# Patient Record
Sex: FEMALE | Race: BLACK OR AFRICAN AMERICAN | Employment: OTHER | URBAN - METROPOLITAN AREA
[De-identification: names, ages, dates, MRNs, and addresses within clinical notes are randomized per-mention and may not be internally consistent; named-entity substitution may affect disease eponyms.]

---

## 2017-01-02 RX ORDER — METFORMIN HYDROCHLORIDE 500 MG/1
TABLET ORAL
Qty: 30 TAB | Refills: 11 | Status: SHIPPED | OUTPATIENT
Start: 2017-01-02 | End: 2017-12-28 | Stop reason: SDUPTHER

## 2017-02-01 RX ORDER — CELECOXIB 200 MG/1
CAPSULE ORAL
Qty: 60 CAP | Refills: 11 | Status: SHIPPED | OUTPATIENT
Start: 2017-02-01 | End: 2017-03-06 | Stop reason: SDUPTHER

## 2017-02-01 RX ORDER — PHENYTOIN SODIUM 100 MG/1
CAPSULE, EXTENDED RELEASE ORAL
Qty: 90 CAP | Refills: 11 | Status: SHIPPED | OUTPATIENT
Start: 2017-02-01 | End: 2018-01-09 | Stop reason: SDUPTHER

## 2017-03-06 RX ORDER — CELECOXIB 200 MG/1
CAPSULE ORAL
Qty: 60 CAP | Refills: 11 | Status: SHIPPED | OUTPATIENT
Start: 2017-03-06 | End: 2017-12-07 | Stop reason: ALTCHOICE

## 2017-03-07 ENCOUNTER — HOSPITAL ENCOUNTER (OUTPATIENT)
Dept: MAMMOGRAPHY | Age: 72
Discharge: HOME | End: 2017-03-07
Admitting: INTERNAL MEDICINE
Payer: MEDICARE

## 2017-03-07 DIAGNOSIS — Z12.31 VISIT FOR SCREENING MAMMOGRAM: ICD-10-CM

## 2017-03-07 PROCEDURE — 77067 SCR MAMMO BI INCL CAD: CPT

## 2017-04-21 ENCOUNTER — TELEPHONE (OUTPATIENT)
Dept: INTERNAL MEDICINE CLINIC | Age: 72
End: 2017-04-21

## 2017-04-21 NOTE — TELEPHONE ENCOUNTER
Patient called to schedule medicare wellness appointment.  No answer, holly was left to call office back

## 2017-06-23 ENCOUNTER — APPOINTMENT (OUTPATIENT)
Dept: CT IMAGING | Age: 72
End: 2017-06-23
Attending: EMERGENCY MEDICINE
Payer: MEDICARE

## 2017-06-23 ENCOUNTER — HOSPITAL ENCOUNTER (EMERGENCY)
Age: 72
Discharge: HOME | End: 2017-06-23
Attending: EMERGENCY MEDICINE | Admitting: EMERGENCY MEDICINE
Payer: MEDICARE

## 2017-06-23 VITALS
SYSTOLIC BLOOD PRESSURE: 180 MMHG | HEART RATE: 90 BPM | BODY MASS INDEX: 26.5 KG/M2 | HEIGHT: 60 IN | WEIGHT: 135 LBS | OXYGEN SATURATION: 100 % | RESPIRATION RATE: 16 BRPM | TEMPERATURE: 98.4 F | DIASTOLIC BLOOD PRESSURE: 68 MMHG

## 2017-06-23 DIAGNOSIS — S00.93XA CONTUSION OF HEAD, UNSPECIFIED PART OF HEAD, INITIAL ENCOUNTER: Primary | ICD-10-CM

## 2017-06-23 PROCEDURE — 70486 CT MAXILLOFACIAL W/O DYE: CPT

## 2017-06-23 PROCEDURE — 70450 CT HEAD/BRAIN W/O DYE: CPT

## 2017-06-23 PROCEDURE — 99283 EMERGENCY DEPT VISIT LOW MDM: CPT

## 2017-06-23 NOTE — DISCHARGE INSTRUCTIONS
Head Injury: Care Instructions  Your Care Instructions  Most injuries to the head are minor. Bumps, cuts, and scrapes on the head and face usually heal well and can be treated the same as injuries to other parts of the body. Although it's rare, once in a while a more serious problem shows up after you are home. So it's good to be on the lookout for symptoms for a day or two. Follow-up care is a key part of your treatment and safety. Be sure to make and go to all appointments, and call your doctor if you are having problems. It's also a good idea to know your test results and keep a list of the medicines you take. How can you care for yourself at home? · Follow your doctor's instructions. He or she will tell you if you need someone to watch you closely for the next 24 hours or longer. · Take it easy for the next few days or more if you are not feeling well. · Ask your doctor when it's okay for you to go back to activities like driving a car, riding a bike, or operating machinery. When should you call for help? Call 911 anytime you think you may need emergency care. For example, call if:  · You have a seizure. · You passed out (lost consciousness). · You are confused or can't stay awake. Call your doctor now or seek immediate medical care if:  · You have new or worse vomiting. · You feel less alert. · You have new weakness or numbness in any part of your body. Watch closely for changes in your health, and be sure to contact your doctor if:  · You do not get better as expected. · You have new symptoms, such as headaches, trouble concentrating, or changes in mood. Where can you learn more? Go to http://julianne-shakila.info/. Enter X161 in the search box to learn more about \"Head Injury: Care Instructions. \"  Current as of: October 14, 2016  Content Version: 11.3  © 6001-7925 [employer].  Care instructions adapted under license by [employer] (which disclaims liability or warranty for this information). If you have questions about a medical condition or this instruction, always ask your healthcare professional. [place] [address] any warranty or liability for your use of this information.

## 2017-06-23 NOTE — ED TRIAGE NOTES
Per EMS pt was walking and tripped on an uneven floor. Pt fell hitting head and left orbit of eye on floor. Fall was witnessed and pt did not have LOC.

## 2017-06-23 NOTE — ED PROVIDER NOTES
HPI Comments: 66 y.o. female with past medical history significant for seizures, HTN, and diabetes who presents from assisted living via EMS for evaluation after a fall. History is provided by nursing home supervisor. Pt was reportedly walking when she tripped and fell, hitting the left side of her head. Pt presents to the ED with swelling next to her left eye. She reportedly did not lose consciousness. Staff noted no other difficulties or complaints. Witnessed fall. Patient has no complaints. Unable to provide much of her own history due to dementia. There are no other acute medical concerns at this time. Social hx: nonsmoker, no EtOH use  PCP: No primary care provider on file. Note written by Lorie Parikh, as dictated by Farhan Doll MD 2:55 PM      The history is provided by a caregiver. No  was used. Past Medical History:   Diagnosis Date    Diabetes ([facility] [address]     Hypertension     Seizures ([facility] [address]        No past surgical history on file. No family history on file. Social History     Social History    Marital status: N/A     Spouse name: N/A    Number of children: N/A    Years of education: N/A     Occupational History    Not on file. Social History Main Topics    Smoking status: Never Smoker    Smokeless tobacco: Not on file    Alcohol use No    Drug use: Not on file    Sexual activity: Not on file     Other Topics Concern    Not on file     Social History Narrative    No narrative on file         ALLERGIES: Review of patient's allergies indicates no known allergies. Review of Systems   Constitutional: Negative for activity change, appetite change and fatigue. HENT: Negative for ear pain, facial swelling, sore throat and trouble swallowing. Eyes: Negative for pain, discharge and visual disturbance. Respiratory: Negative for chest tightness, shortness of breath and wheezing. Cardiovascular: Negative for chest pain and palpitations. Gastrointestinal: Negative for abdominal pain, blood in stool, nausea and vomiting. Genitourinary: Negative for difficulty urinating, flank pain and hematuria. Musculoskeletal: Negative for arthralgias, joint swelling, myalgias and neck pain. Skin: Positive for wound. Negative for color change and rash. Neurological: Negative for dizziness, weakness, numbness and headaches. Hematological: Negative for adenopathy. Does not bruise/bleed easily. Psychiatric/Behavioral: Negative for behavioral problems, confusion and sleep disturbance. All other systems reviewed and are negative. Vitals:    06/23/17 1453   BP: 180/68   Pulse: 90   Resp: 16   Temp: 98.4 °F (36.9 °C)   SpO2: 100%   Weight: 61.2 kg (135 lb)   Height: 5' (1.524 m)            Physical Exam   Constitutional: She is oriented to person, place, and time. She appears well-developed and well-nourished. No distress. HENT:   Head: Normocephalic. Nose: Nose normal.   Mouth/Throat: Oropharynx is clear and moist.   Hematoma lateral aspect of the left Orbit     Eyes: Conjunctivae and EOM are normal. Pupils are equal, round, and reactive to light. No scleral icterus. Neck: Normal range of motion. Neck supple. No JVD present. No tracheal deviation present. No thyromegaly present. No carotid bruits noted. Cardiovascular: Normal rate, regular rhythm, normal heart sounds and intact distal pulses. Exam reveals no gallop and no friction rub. No murmur heard. Pulmonary/Chest: Effort normal and breath sounds normal. No respiratory distress. She has no wheezes. She has no rales. She exhibits no tenderness. Abdominal: Soft. Bowel sounds are normal. She exhibits no distension and no mass. There is no tenderness. There is no rebound and no guarding. Musculoskeletal: Normal range of motion. She exhibits no edema or tenderness. Lymphadenopathy:     She has no cervical adenopathy.    Neurological: She is alert and oriented to person, place, and time. She has normal reflexes. No cranial nerve deficit. Coordination normal.   Skin: Skin is warm and dry. No rash noted. No erythema. Psychiatric: She has a normal mood and affect. Her behavior is normal. Judgment and thought content normal.   Nursing note and vitals reviewed. Note written by Lorie Singh, as dictated by Harshad Batres MD 2:55 PM    MDM  Number of Diagnoses or Management Options  Contusion of head, unspecified part of head, initial encounter: new and requires workup     Amount and/or Complexity of Data Reviewed  Tests in the radiology section of CPT®: ordered and reviewed  Decide to obtain previous medical records or to obtain history from someone other than the patient: yes  Review and summarize past medical records: yes  Independent visualization of images, tracings, or specimens: yes    Risk of Complications, Morbidity, and/or Mortality  Presenting problems: moderate  Diagnostic procedures: high  Management options: moderate    Patient Progress  Patient progress: stable    ED Course       Procedures    CT of the head and facial bones are negative. The patient was able to ambulate in the ED and there were no other acute findings to suggest additional injury. The fall was witnessed.  Will discharge home with symptomatic treatment and have the patient followup with own MD if needed or to return to the ED;

## 2017-07-18 ENCOUNTER — OFFICE VISIT (OUTPATIENT)
Dept: INTERNAL MEDICINE CLINIC | Age: 72
End: 2017-07-18

## 2017-07-18 VITALS
TEMPERATURE: 98.1 F | HEIGHT: 60 IN | SYSTOLIC BLOOD PRESSURE: 155 MMHG | DIASTOLIC BLOOD PRESSURE: 68 MMHG | OXYGEN SATURATION: 100 % | HEART RATE: 76 BPM | RESPIRATION RATE: 19 BRPM | BODY MASS INDEX: 27.09 KG/M2 | WEIGHT: 138 LBS

## 2017-07-18 DIAGNOSIS — E78.00 HYPERCHOLESTEROLEMIA: ICD-10-CM

## 2017-07-18 DIAGNOSIS — Z71.89 ACP (ADVANCE CARE PLANNING): ICD-10-CM

## 2017-07-18 DIAGNOSIS — E11.9 TYPE 2 DIABETES MELLITUS WITHOUT COMPLICATION, WITHOUT LONG-TERM CURRENT USE OF INSULIN (HCC): ICD-10-CM
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
## 2017-07-18 DIAGNOSIS — I10 ESSENTIAL HYPERTENSION: ICD-10-CM

## 2017-07-18 DIAGNOSIS — Z13.39 SCREENING FOR ALCOHOLISM: ICD-10-CM

## 2017-07-18 DIAGNOSIS — Z00.00 ROUTINE GENERAL MEDICAL EXAMINATION AT A HEALTH CARE FACILITY: Primary | ICD-10-CM

## 2017-07-18 DIAGNOSIS — R56.9 SEIZURES (HCC): ICD-10-CM

## 2017-07-18 NOTE — MR AVS SNAPSHOT
Visit Information Date & Time Provider Department Dept. Phone Encounter #  
 7/18/2017  4:30 PM Gt Valdes MD [facility] - [facility] [phone] [id number] Follow-up Instructions Return in about 4 months (around 11/18/2017). Follow-up and Disposition History Upcoming Health Maintenance Date Due FOBT Q 1 YEAR AGE 50-75 3/17/2016 EYE EXAM RETINAL OR DILATED Q1 4/5/2017 FOOT EXAM Q1 7/28/2017 INFLUENZA AGE 9 TO ADULT 8/1/2017 HEMOGLOBIN A1C Q6M 1/18/2018 GLAUCOMA SCREENING Q2Y 4/5/2018 Pneumococcal 65+ Low/Medium Risk (2 of 2 - PPSV23) 7/18/2018 MICROALBUMIN Q1 7/18/2018 LIPID PANEL Q1 7/18/2018 MEDICARE YEARLY EXAM 7/19/2018 BREAST CANCER SCRN MAMMOGRAM 3/7/2019 DTaP/Tdap/Td series (2 - Td) 7/18/2027 Allergies as of 7/18/2017  Review Complete On: 7/18/2017 By: Gt Valdes MD  
 No Known Allergies Current Immunizations  Never Reviewed No immunizations on file. Not reviewed this visit You Were Diagnosed With   
  
 Codes Comments Routine general medical examination at a health care facility    -  Primary ICD-10-CM: Z00.00 ICD-9-CM: V70.0 Screening for alcoholism     ICD-10-CM: Z13.89 ICD-9-CM: V79.1 Type 2 diabetes mellitus without complication, without long-term current use of insulin (HCC)     ICD-10-CM: E11.9 ICD-9-CM: 250.00 Seizures ([address]     ICD-10-CM: R56.9 ICD-9-CM: 780.39 Hypercholesterolemia     ICD-10-CM: E78.00 ICD-9-CM: 272.0 Essential hypertension     ICD-10-CM: I10 
ICD-9-CM: 401.9 Fall, sequela     ICD-10-CM: W19. XXXS 
ICD-9-CM: 909.4, E929.3 ACP (advance care planning)     ICD-10-CM: Z71.89 ICD-9-CM: V65.49 Vitals BP Pulse Temp Resp Height(growth percentile) Weight(growth percentile) 155/68 (BP 1 Location: Right arm, BP Patient Position: Sitting) 76 98.1 °F (36.7 °C) (Oral) 19 5' (1.524 m) 138 lb (62.6 kg) SpO2 BMI OB Status Smoking Status 100% 26.95 kg/m2 Postmenopausal Never Smoker BMI and BSA Data Body Mass Index Body Surface Area  
 26.95 kg/m 2 1.63 m 2 Preferred Pharmacy Pharmacy Name [facility] [address] [phone] Your Updated Medication List  
  
   
This list is accurate as of: 7/18/17  5:21 PM.  Always use your most recent med list.  
  
  
  
  
 celecoxib 200 mg capsule Commonly known as:  CELEBREX Take 1 capsule twice daily  
  
 lisinopril-hydroCHLOROthiazide 10-12.5 mg per tablet Commonly known as:  PRINZIDE, ZESTORETIC  
take 1 tablet by mouth once daily  
  
 lovastatin 40 mg tablet Commonly known as:  MEVACOR  
take 1 tablet by mouth once daily WITH A MEAL  
  
 metFORMIN 500 mg tablet Commonly known as:  GLUCOPHAGE  
take 1 tablet by mouth once daily -WITH DINNER  
  
 phenytoin  mg ER capsule Commonly known as:  DILANTIN ER Take 3 tabs on odd day and 2 tabs on even days We Performed the Following CBC WITH AUTOMATED DIFF [85818 CPT(R)] DO NOT RESUSCITATE Kushal Soto HEMOGLOBIN A1C WITH EAG [53083 CPT(R)] LIPID PANEL [77325 CPT(R)] METABOLIC PANEL, COMPREHENSIVE [32554 CPT(R)] MICROALB/CREAT RATIO, TIMED UR F6310220 CPT(R)] PHENYTOIN [id number] CPT(R)] UT COLLECTION VENOUS BLOOD,VENIPUNCTURE [id number] CPT(R)] TSH 3RD GENERATION [41675 CPT(R)] URINALYSIS W/ RFLX MICROSCOPIC [29642 CPT(R)] Follow-up Instructions Return in about 4 months (around 11/18/2017). Introducing [facility]! Brenna Vázquez introduces [employer] patient portal. Now you can access parts of your medical record, email your doctor's office, and request medication refills online. 1. In your internet browser, go to [email or web address] 2. Click on the First Time User? Click Here link in the Sign In box.  You will see the New Member Sign Up page. 3. Enter your [employer] Access Code exactly as it appears below. You will not need to use this code after youve completed the sign-up process. If you do not sign up before the expiration date, you must request a new code. · [employer] Access Code: [email or web address] Expires: 9/21/2017  4:28 PM 
 
4. Enter the last four digits of your Social Security Number (xxxx) and Date of Birth (mm/dd/yyyy) as indicated and click Submit. You will be taken to the next sign-up page. 5. Create a [employer] ID. This will be your [employer] login ID and cannot be changed, so think of one that is secure and easy to remember. 6. Create a [employer] password. You can change your password at any time. 7. Enter your Password Reset Question and Answer. This can be used at a later time if you forget your password. 8. Enter your e-mail address. You will receive e-mail notification when new information is available in [address] 9. Click Sign Up. You can now view and download portions of your medical record. 10. Click the Download Summary menu link to download a portable copy of your medical information. If you have questions, please visit the Frequently Asked Questions section of the [employer] website. Remember, [employer] is NOT to be used for urgent needs. For medical emergencies, dial 911. Now available from your iPhone and Android! Please provide this summary of care documentation to your next provider. Your primary care clinician is listed as Kayla Boyd. If you have any questions after today's visit, please call [phone].

## 2017-07-18 NOTE — PROGRESS NOTES
.1. Have you been to the ER, urgent care clinic since your last visit? Hospitalized since your last visit? No    2. Have you seen or consulted any other health care providers outside of the [address] [place] [address] since your last visit? Include any pap smears or colon screening. No    This is a Subsequent Medicare Annual Wellness Visit providing Personalized Prevention Plan Services (PPPS) (Performed 12 months after initial AWV and PPPS )    I have reviewed the patient's medical history in detail and updated the computerized patient record. HistoryPatient returns today ambulatory with assistive device, alert and oriented and history is primarily obtained from her sister, Dani Marie. There are no new complaints and patient is seen for evaluation. Past Medical History:   Diagnosis Date    Colonoscopy refused 1-7-16    Diabetes [facility])     Fall     Fracture of rib of left side 7-23-15    Hypercholesterolemia     Hypertension     S/P colonoscopy 4-17-03    n    Seizures ([facility] [address]       History reviewed. No pertinent surgical history.   Current Outpatient Prescriptions   Medication Sig Dispense Refill    celecoxib (CELEBREX) 200 mg capsule Take 1 capsule twice daily 60 Cap 11    phenytoin ER (DILANTIN ER) 100 mg ER capsule Take 3 tabs on odd day and 2 tabs on even days 90 Cap 11    metFORMIN (GLUCOPHAGE) 500 mg tablet take 1 tablet by mouth once daily -WITH DINNER 30 Tab 11    lisinopril-hydroCHLOROthiazide (PRINZIDE, ZESTORETIC) 10-12.5 mg per tablet take 1 tablet by mouth once daily 30 Tab 11    lovastatin (MEVACOR) 40 mg tablet take 1 tablet by mouth once daily WITH A MEAL 30 Tab 11     No Known Allergies  Family History   Problem Relation Age of Onset    Diabetes Mother     No Known Problems Father      Social History   Substance Use Topics    Smoking status: Never Smoker    Smokeless tobacco: Not on file    Alcohol use No     Patient Active Problem List   Diagnosis Code    Hypercholesterolemia E78.00    Diabetes ([facility] [address] E11.9    Hypertension I10    Seizures ([facility] [address] R56.9    Fall W19. Magui Kramer    Fracture of rib of left side S22.32XA       Depression Risk Factor Screening:     PHQ over the last two weeks 7/18/2017   PHQ Not Done Patient Decline   Little interest or pleasure in doing things Not at all   Feeling down, depressed or hopeless Not at all   Total Score PHQ 2 0     Alcohol Risk Factor Screening: On any occasion during the past 3 months, have you had more than 3 drinks containing alcohol? No    Do you average more than 7 drinks per week? No        Functional Ability and Level of Safety:     Hearing Loss   normal-to-mild    Activities of Daily Living   Partial assistance. Requires assistance with: bathing and hygiene, toileting and dressing    Fall Risk   Fall Risk Assessment, last 12 mths 7/18/2017   Able to walk? Yes   Fall in past 12 months? No   Fall with injury? -   Number of falls in past 12 months -   Fall Risk Score -     Abuse Screen   Patient is not abused    Review of Systems   A comprehensive review of systems was negative except for that written in the HPI. Physical Examination     Evaluation of Cognitive Function:  Mood/affect:  neutral  Appearance: age appropriate  Family member/caregiver input: sister    Visit Vitals    /68 (BP 1 Location: Right arm, BP Patient Position: Sitting)    Pulse 76    Temp 98.1 °F (36.7 °C) (Oral)    Resp 19    Ht 5' (1.524 m)    Wt 138 lb (62.6 kg)    SpO2 100%    BMI 26.95 kg/m2     General:  Alert, cooperative, no distress, appears stated age. Head:  Normocephalic, without obvious abnormality, atraumatic. Eyes:  Conjunctivae/corneas clear. PERRL, EOMs intact. Fundi benign. Ears:  Normal TMs and external ear canals both ears. Nose: Nares normal. Septum midline. Mucosa normal. No drainage or sinus tenderness.    Throat: Lips, mucosa, and tongue normal. Teeth and gums normal.   Neck: Supple, symmetrical, trachea midline, no adenopathy, thyroid: no enlargement/tenderness/nodules, no carotid bruit and no JVD. Back:   Symmetric, no curvature. ROM normal. No CVA tenderness. Lungs:   Clear to auscultation bilaterally. Chest wall:  No tenderness or deformity. Heart:  Regular rate and rhythm, S1, S2 normal, no murmur, click, rub or gallop. Breast Exam:  No tenderness, masses, or nipple abnormality. Abdomen:   Soft, non-tender. Bowel sounds normal. No masses,  No organomegaly. Genitalia:  na   Rectal:     Extremities: Extremities normal, atraumatic, no cyanosis or edema. Pulses: 2+ and symmetric all extremities. Skin: Skin color, texture, turgor normal. No rashes or lesions. Lymph nodes: Cervical, supraclavicular, and axillary nodes normal.   Neurologic: CNII-XII intact. Normal strength, sensation and reflexes throughout. Patient Care Team:  Prabhakar Fowler MD as PCP - General (Internal Medicine)  Prabhakar Fowler MD (Internal Medicine)    Advice/Referrals/Counseling   Education and counseling provided:  Are appropriate based on today's review and evaluation     Advance Care Planning (ACP) Provider Conversation Snapshot    Date of ACP Conversation: 07/18/17  Persons included in Conversation:  patient  Length of ACP Conversation in minutes:  <16 minutes (Non-Billable)    Authorized Decision Maker (if patient is incapable of making informed decisions): This person is:   Healthcare Agent/Medical Power of  under Advance Directive  sister        For Patients with Decision Making Capacity:   Values/Goals: Exploration of values, goals, and preferences if recovery is not expected, even with continued medical treatment in the event of:  Imminent death  Severe, permanent brain injury  \"In these circumstances, what matters most to you? \"  Care focused more on comfort or quality of life.     Conversation Outcomes / Follow-Up Plan:   Entered DNR order (If yes, complete Durable DNR form)    Patient's medical status is stable. Blood pressure is at the upper limits of normal but acceptable. She is approaching her ideal body weight. Physical form is completed for the  center. Appropriate laboratory studies have been requested. She will return to the office in four months, sooner if she has any problems. Assessment/Plan       ICD-10-CM ICD-9-CM    1. Routine general medical examination at a health care facility Z00.00 V70.0    2. Screening for alcoholism Z13.89 V79.1    3. Type 2 diabetes mellitus without complication, without long-term current use of insulin (HCC) E11.9 250.00 HEMOGLOBIN A1C WITH EAG      MICROALB/CREAT RATIO, TIMED UR      URINALYSIS W/ RFLX MICROSCOPIC      CBC WITH AUTOMATED DIFF      METABOLIC PANEL, COMPREHENSIVE      TSH 3RD GENERATION      TN COLLECTION VENOUS BLOOD,VENIPUNCTURE   4. Seizures ([address] R56.9 780.39 PHENYTOIN   5. Hypercholesterolemia E78.00 272.0 LIPID PANEL   6. Essential hypertension I10 401.9    7. Fall, sequela W19. XXXS 909.4      E929.3    8. ACP (advance care planning) Z71.89 V65.49 DO NOT RESUSCITATE   .

## 2017-07-20 LAB
ALBUMIN 24H UR-MRATE: NORMAL MG/DAY
ALBUMIN ?TM UR-MRATE: NORMAL UG/MIN (ref 0–20)
ALBUMIN SERPL-MCNC: 4 G/DL (ref 3.5–4.8)
ALBUMIN/CREAT UR: <4.5 UG/MG CREAT (ref 0–30)
ALBUMIN/GLOB SERPL: 1.2 {RATIO} (ref 1.2–2.2)
ALP SERPL-CCNC: 57 IU/L (ref 39–117)
ALT SERPL-CCNC: 20 IU/L (ref 0–32)
APPEARANCE UR: CLEAR
AST SERPL-CCNC: 13 IU/L (ref 0–40)
BACTERIA #/AREA URNS HPF: ABNORMAL /[HPF]
BASOPHILS # BLD AUTO: 0 X10E3/UL (ref 0–0.2)
BASOPHILS NFR BLD AUTO: 0 %
BILIRUB SERPL-MCNC: <0.2 MG/DL (ref 0–1.2)
BILIRUB UR QL STRIP: NEGATIVE
BUN SERPL-MCNC: 16 MG/DL (ref 8–27)
BUN/CREAT SERPL: 20 (ref 12–28)
CALCIUM SERPL-MCNC: 9.1 MG/DL (ref 8.7–10.3)
CASTS URNS QL MICRO: ABNORMAL /LPF
CHLORIDE SERPL-SCNC: 99 MMOL/L (ref 96–106)
CHOLEST SERPL-MCNC: 184 MG/DL (ref 100–199)
CO2 SERPL-SCNC: 28 MMOL/L (ref 18–29)
COLOR UR: YELLOW
CREAT SERPL-MCNC: 0.8 MG/DL (ref 0.57–1)
CREAT UR-MCNC: 66.7 MG/DL
EOSINOPHIL # BLD AUTO: 0.2 X10E3/UL (ref 0–0.4)
EOSINOPHIL NFR BLD AUTO: 4 %
EPI CELLS #/AREA URNS HPF: ABNORMAL /HPF
ERYTHROCYTE [DISTWIDTH] IN BLOOD BY AUTOMATED COUNT: 13.5 % (ref 12.3–15.4)
EST. AVERAGE GLUCOSE BLD GHB EST-MCNC: 103 MG/DL
GLOBULIN SER CALC-MCNC: 3.4 G/DL (ref 1.5–4.5)
GLUCOSE SERPL-MCNC: 100 MG/DL (ref 65–99)
GLUCOSE UR QL: NEGATIVE
HBA1C MFR BLD: 5.2 % (ref 4.8–5.6)
HCT VFR BLD AUTO: 34.8 % (ref 34–46.6)
HDLC SERPL-MCNC: 76 MG/DL
HGB BLD-MCNC: 12.1 G/DL (ref 11.1–15.9)
HGB UR QL STRIP: NEGATIVE
IMM GRANULOCYTES # BLD: 0 X10E3/UL (ref 0–0.1)
IMM GRANULOCYTES NFR BLD: 0 %
KETONES UR QL STRIP: NEGATIVE
LDLC SERPL CALC-MCNC: 99 MG/DL (ref 0–99)
LEUKOCYTE ESTERASE UR QL STRIP: ABNORMAL
LYMPHOCYTES # BLD AUTO: 1.9 X10E3/UL (ref 0.7–3.1)
LYMPHOCYTES NFR BLD AUTO: 35 %
MCH RBC QN AUTO: 30.8 PG (ref 26.6–33)
MCHC RBC AUTO-ENTMCNC: 34.8 G/DL (ref 31.5–35.7)
MCV RBC AUTO: 89 FL (ref 79–97)
MICRO URNS: ABNORMAL
MICROALBUMIN UR-MCNC: <3 UG/ML
MONOCYTES # BLD AUTO: 0.4 X10E3/UL (ref 0.1–0.9)
MONOCYTES NFR BLD AUTO: 7 %
NEUTROPHILS # BLD AUTO: 2.8 X10E3/UL (ref 1.4–7)
NEUTROPHILS NFR BLD AUTO: 54 %
NITRITE UR QL STRIP: NEGATIVE
PH UR STRIP: 6 [PH] (ref 5–7.5)
PHENYTOIN SERPL-MCNC: 19.9 UG/ML (ref 10–20)
PLATELET # BLD AUTO: 234 X10E3/UL (ref 150–379)
POTASSIUM SERPL-SCNC: 4.2 MMOL/L (ref 3.5–5.2)
PROT SERPL-MCNC: 7.4 G/DL (ref 6–8.5)
PROT UR QL STRIP: NEGATIVE
RBC # BLD AUTO: 3.93 X10E6/UL (ref 3.77–5.28)
RBC #/AREA URNS HPF: ABNORMAL /HPF
SODIUM SERPL-SCNC: 140 MMOL/L (ref 134–144)
SP GR UR: 1.01 (ref 1–1.03)
TRIGL SERPL-MCNC: 46 MG/DL (ref 0–149)
TSH SERPL DL<=0.005 MIU/L-ACNC: 0.62 UIU/ML (ref 0.45–4.5)
UROBILINOGEN UR STRIP-MCNC: 0.2 MG/DL (ref 0.2–1)
VLDLC SERPL CALC-MCNC: 9 MG/DL (ref 5–40)
WBC # BLD AUTO: 5.3 X10E3/UL (ref 3.4–10.8)
WBC #/AREA URNS HPF: ABNORMAL /HPF

## 2017-11-28 DIAGNOSIS — I10 ESSENTIAL HYPERTENSION: Primary | ICD-10-CM

## 2017-11-28 RX ORDER — LOVASTATIN 40 MG/1
TABLET ORAL
Qty: 30 TAB | Refills: 11 | Status: SHIPPED | OUTPATIENT
Start: 2017-11-28 | End: 2018-11-09 | Stop reason: SDUPTHER

## 2017-11-28 RX ORDER — LISINOPRIL AND HYDROCHLOROTHIAZIDE 10; 12.5 MG/1; MG/1
TABLET ORAL
Qty: 30 TAB | Refills: 11 | Status: SHIPPED | OUTPATIENT
Start: 2017-11-28 | End: 2018-11-09 | Stop reason: SDUPTHER

## 2017-12-07 ENCOUNTER — OFFICE VISIT (OUTPATIENT)
Dept: INTERNAL MEDICINE CLINIC | Age: 72
End: 2017-12-07

## 2017-12-07 VITALS
WEIGHT: 136 LBS | BODY MASS INDEX: 26.7 KG/M2 | HEIGHT: 60 IN | RESPIRATION RATE: 23 BRPM | TEMPERATURE: 98.5 F | OXYGEN SATURATION: 100 % | DIASTOLIC BLOOD PRESSURE: 66 MMHG | SYSTOLIC BLOOD PRESSURE: 149 MMHG | HEART RATE: 84 BPM

## 2017-12-07 DIAGNOSIS — E78.00 HYPERCHOLESTEROLEMIA: ICD-10-CM

## 2017-12-07 DIAGNOSIS — G81.94 HEMIPARESIS OF LEFT NONDOMINANT SIDE, UNSPECIFIED HEMIPARESIS ETIOLOGY (HCC): ICD-10-CM

## 2017-12-07 DIAGNOSIS — E11.9 TYPE 2 DIABETES MELLITUS WITHOUT COMPLICATION, WITHOUT LONG-TERM CURRENT USE OF INSULIN (HCC): ICD-10-CM

## 2017-12-07 DIAGNOSIS — I10 ESSENTIAL HYPERTENSION: ICD-10-CM

## 2017-12-07 DIAGNOSIS — R56.9 SEIZURES (HCC): Primary | ICD-10-CM

## 2017-12-07 NOTE — PROGRESS NOTES
SPORTS MEDICINE AND PRIMARY CARE  Salvatore Lowery MD, [address] [address]  Phone:  [phone]  Fax: [phone]       Chief Complaint   Patient presents with    Diabetes   . SUBJECTIVE:    Greg Simmons is a 66 y.o. female  Dictation on: 12/07/2017  4:52 PM by: Herber Velasco [[id number]]            Current Outpatient Prescriptions   Medication Sig Dispense Refill    lisinopril-hydroCHLOROthiazide (PRINZIDE, ZESTORETIC) 10-12.5 mg per tablet take 1 tablet by mouth once daily 30 Tab 11    lovastatin (MEVACOR) 40 mg tablet take 1 tablet by mouth once daily WITH A MEAL 30 Tab 11    phenytoin ER (DILANTIN ER) 100 mg ER capsule Take 3 tabs on odd day and 2 tabs on even days 90 Cap 11    metFORMIN (GLUCOPHAGE) 500 mg tablet take 1 tablet by mouth once daily -WITH DINNER 30 Tab 11     Past Medical History:   Diagnosis Date    Colonoscopy refused 1-7-16    Diabetes [facility])     Fall     Fracture of rib of left side 7-23-15    Hemiparesis ([facility] 1950    left    Hypercholesterolemia     Hypertension     Intellectual disability     S/P colonoscopy 4-17-03    n    Seizures ([facility]      History reviewed. No pertinent surgical history.   No Known Allergies      REVIEW OF SYSTEMS:  General: negative for - chills or fever  ENT: negative for - headaches, nasal congestion or tinnitus  Respiratory: negative for - cough, hemoptysis, shortness of breath or wheezing  Cardiovascular : negative for - chest pain, edema, palpitations or shortness of breath  Gastrointestinal: negative for - abdominal pain, blood in stools, heartburn or nausea/vomiting  Genito-Urinary: no dysuria, trouble voiding, or hematuria  Musculoskeletal: negative for - gait disturbance, joint pain, joint stiffness or joint swelling  Neurological: no TIA or stroke symptoms  Hematologic: no bruises, no bleeding, no swollen glands  Integument: no lumps, mole changes, nail changes or rash  Endocrine: no malaise/lethargy or unexpected weight changes      Social History     Social History    Marital status: SINGLE     Spouse name: N/A    Number of children: N/A    Years of education: N/A     Social History Main Topics    Smoking status: Never Smoker    Smokeless tobacco: Never Used    Alcohol use No    Drug use: No    Sexual activity: Not Asked     Other Topics Concern    None     Social History Narrative    ** Merged History Encounter **     Medical History: DM2 non-insulin req. 1997primary HTN 1992Dyslipidemia 1992Mental retardation 1950Seizure d/o Left hemiparesis    Obesity     Gyn History: Last mammogram date 2013. Last menstrual perioddate . OB History: Total pregnancies 0. Surgical History: colonoscopy     Hospitalization/Major Diagnostic Procedure: seizure d/o     Family History: Mother: , DM complications, HTN, ASHDFather:  79 yrsChildren: Michelle Valdovinos brother(s) , 4 sister(s) . Social History: Alcohol Use Patient does not use alcohol. Smoking Status Patient is a never smoker. Marital Status: Single. Lives w ith:    sister(s). Occupation/W ork: disabled. Education/School: high school. Presybeterian: no.     Family History   Problem Relation Age of Onset    Diabetes Mother     No Known Problems Father        OBJECTIVE:    Visit Vitals    /66 (BP 1 Location: Right arm, BP Patient Position: Sitting)    Pulse 84    Temp 98.5 °F (36.9 °C) (Oral)    Resp 23    Ht 5' (1.524 m)    Wt 136 lb (61.7 kg)    SpO2 100%    BMI 26.56 kg/m2     CONSTITUTIONAL: well , well nourished, appears age appropriate  EYES: perrla, eom intact  ENMT:moist mucous membranes, pharynx clear  NECK: supple.  Thyroid normal  RESPIRATORY: Chest: clear bilaterally   CARDIOVASCULAR: Heart: regular rate and rhythm  GASTROINTESTINAL: Abdomen: soft, bowel sounds active  HEMATOLOGIC: no pathological lymph nodes palpated  MUSCULOSKELETAL: Extremities: no edema, pulse 1+   INTEGUMENT: No unusual rashes or suspicious skin lesions noted. Nails appear normal.  NEUROLOGIC: non-focal exam   MENTAL STATUS: alert and oriented, appropriate affect           ASSESSMENT:  1. Seizures ([address]    2. Type 2 diabetes mellitus without complication, without long-term current use of insulin ([address]    3. Hypercholesterolemia    4. Essential hypertension    5. Hemiparesis of left nondominant side, unspecified hemiparesis etiology ([facility]       Dictation on: 12/07/2017  4:53 PM by: Osiel Medeiros      Dictation on: 12/07/2017  4:55 PM by: Patsy Tate [[id number]]         PLAN:  .  Orders Placed This Encounter    HEMOGLOBIN A1C WITH EAG    PHENYTOIN       Follow-up Disposition:  Return in about 6 months (around 6/7/2018). ATTENTION:   This medical record was transcribed using an electronic medical records system. Although proofread, it may and can contain electronic and spelling errors. Other human spelling and other errors may be present. Corrections may be executed at a later time. Please feel free to contact us for any clarifications as needed.

## 2017-12-07 NOTE — PROGRESS NOTES
Patient returns today with her sister. She has a known history of intellectual disability, ***, dyslipidemia, primary hypertension, diabetes and seizure disorder. Patient voices no complaints. She's had no seizures and she is doing well.

## 2017-12-07 NOTE — Clinical Note
SPORTS MEDICINE AND PRIMARY CARE Albert Hsieh MD, [address] [address] Phone:  [phone]  Fax: [phone] Chief Complaint Patient presents with  Diabetes Mauro Ellison SUBJECTIVE: 
  Ryland Barraza is a 66 y.o. female Patient returns today with her sister. She has a known history of intellectual disability, ***, dyslipidemia, primary hypertension, diabetes and seizure disorder. Patient voices no complaints. She's had no seizures and she is doing well. Current Outpatient Prescriptions Medication Sig Dispense Refill  lisinopril-hydroCHLOROthiazide (PRINZIDE, ZESTORETIC) 10-12.5 mg per tablet take 1 tablet by mouth once daily 30 Tab 11  
 lovastatin (MEVACOR) 40 mg tablet take 1 tablet by mouth once daily WITH A MEAL 30 Tab 11  
 phenytoin ER (DILANTIN ER) 100 mg ER capsule Take 3 tabs on odd day and 2 tabs on even days 90 Cap 11  
 metFORMIN (GLUCOPHAGE) 500 mg tablet take 1 tablet by mouth once daily -WITH DINNER 30 Tab 11 Past Medical History:  
Diagnosis Date  Colonoscopy refused 1-7-16  Diabetes ([facility]  Fall  Fracture of rib of left side 7-23-15  Hemiparesis ([facility] 1950  
 left  Hypercholesterolemia  Hypertension  Intellectual disability  S/P colonoscopy 4-17-03  
 n  Seizures ([facility] History reviewed. No pertinent surgical history. No Known Allergies REVIEW OF SYSTEMS: 
General: negative for - chills or fever ENT: negative for - headaches, nasal congestion or tinnitus Respiratory: negative for - cough, hemoptysis, shortness of breath or wheezing Cardiovascular : negative for - chest pain, edema, palpitations or shortness of breath Gastrointestinal: negative for - abdominal pain, blood in stools, heartburn or nausea/vomiting Genito-Urinary: no dysuria, trouble voiding, or hematuria Musculoskeletal: negative for - gait disturbance, joint pain, joint stiffness or joint swelling Neurological: no TIA or stroke symptoms Hematologic: no bruises, no bleeding, no swollen glands Integument: no lumps, mole changes, nail changes or rash Endocrine: no malaise/lethargy or unexpected weight changes Social History Social History  Marital status: SINGLE Spouse name: N/A  
 Number of children: N/A  
 Years of education: N/A Social History Main Topics  Smoking status: Never Smoker  Smokeless tobacco: Never Used  Alcohol use No  
 Drug use: No  
 Sexual activity: Not Asked Other Topics Concern  None Social History Narrative ** Merged History Encounter ** Medical History: DM2 non-insulin req. 1997primary HTN 1992Dyslipidemia 1992Mental retardation 1950Seizure d/o 1980Left hemiparesis [address] Gyn History: Last mammogram date 2013. Last menstrual perioddate . OB History: Total pregnancies 0. Surgical History: colonoscopy  Hospitalization/Major Diagnostic Procedure: seizure d/o  Family History: Mother: , DM complications, HTN, ASHDFather:  79 yrsChildren: Michelle Valdovinos brother(s) , 4 sister(s) . Social History: Alcohol Use Patient does not use alcohol. Smoking Status Patient is a never smoker. Marital Status: Single. Lives w ith:  
 sister(s). Occupation/W ork: disabled. Education/School: high school. Cheondoism: no.  
 
Family History Problem Relation Age of Onset  Diabetes Mother  No Known Problems Father OBJECTIVE: 
 
Visit Vitals  /66 (BP 1 Location: Right arm, BP Patient Position: Sitting)  Pulse 84  Temp 98.5 °F (36.9 °C) (Oral)  Resp 23  
 Ht 5' (1.524 m)  Wt 136 lb (61.7 kg)  SpO2 100%  BMI 26.56 kg/m2 CONSTITUTIONAL: well , well nourished, appears age appropriate EYES: perrla, eom intact ENMT:moist mucous membranes, pharynx clear NECK: supple. Thyroid normal 
RESPIRATORY: Chest: clear bilaterally CARDIOVASCULAR: Heart: regular rate and rhythm GASTROINTESTINAL: Abdomen: soft, bowel sounds active HEMATOLOGIC: no pathological lymph nodes palpated MUSCULOSKELETAL: Extremities: no edema, pulse 1+ INTEGUMENT: No unusual rashes or suspicious skin lesions noted. Nails appear normal. 
NEUROLOGIC: non-focal exam  
MENTAL STATUS: alert and oriented, appropriate affect ASSESSMENT: 
1. Seizures ([address] 2. Type 2 diabetes mellitus without complication, without long-term current use of insulin ([address] 3. Hypercholesterolemia 4. Essential hypertension 5. Hemiparesis of left nondominant side, unspecified hemiparesis etiology ([address] Blood pressure elevation is noted when she first came in. It is drifting down towards normal now at 142/68. No adjustments will be made in her medications. Her blood sugar control is excellent without the use of medication with Hgb A1c less than 6. Dyslipidemia is controlled with statin. No seizures. Will confirm Dilantin level. She is approaching her ideal body weight. She'll return to the office in six months, sooner if she has any problems. Correction:  She is on Metformin for blood sugar control. PLAN: 
. Orders Placed This Encounter  HEMOGLOBIN A1C WITH EAG  
 PHENYTOIN Follow-up Disposition: 
Return in about 6 months (around 6/7/2018). ATTENTION:  
This medical record was transcribed using an electronic medical records system. Although proofread, it may and can contain electronic and spelling errors. Other human spelling and other errors may be present. Corrections may be executed at a later time. Please feel free to contact us for any clarifications as needed.

## 2017-12-07 NOTE — MR AVS SNAPSHOT
Visit Information Date & Time Provider Department Dept. Phone Encounter #  
 12/7/2017  4:15 PM Love Coronel MD SPORTS MED AND PRIMARY CARE - [facility] [phone] [id number] Follow-up Instructions Return in about 6 months (around 6/7/2018). Follow-up and Disposition History Upcoming Health Maintenance Date Due  
 EYE EXAM RETINAL OR DILATED Q1 6/7/2018* FOBT Q 1 YEAR AGE 50-75 6/7/2018* HEMOGLOBIN A1C Q6M 1/18/2018 GLAUCOMA SCREENING Q2Y 4/5/2018 Pneumococcal 65+ Low/Medium Risk (2 of 2 - PPSV23) 7/18/2018 MICROALBUMIN Q1 7/18/2018 LIPID PANEL Q1 7/18/2018 MEDICARE YEARLY EXAM 7/19/2018 FOOT EXAM Q1 12/7/2018 BREAST CANCER SCRN MAMMOGRAM 3/7/2019 DTaP/Tdap/Td series (2 - Td) 7/18/2027 *Topic was postponed. The date shown is not the original due date. Allergies as of 12/7/2017  Review Complete On: 12/7/2017 By: Love Coronel MD  
 No Known Allergies Current Immunizations  Never Reviewed No immunizations on file. Not reviewed this visit You Were Diagnosed With   
  
 Codes Comments Seizures ([facility]    -  Primary ICD-10-CM: R56.9 ICD-9-CM: 780.39 Type 2 diabetes mellitus without complication, without long-term current use of insulin (HCC)     ICD-10-CM: E11.9 ICD-9-CM: 250.00 Hypercholesterolemia     ICD-10-CM: E78.00 ICD-9-CM: 272.0 Essential hypertension     ICD-10-CM: I10 
ICD-9-CM: 401.9 Hemiparesis of left nondominant side, unspecified hemiparesis etiology [facility]     ICD-10-CM: G81.94 
ICD-9-CM: 342.92 Vitals BP Pulse Temp Resp Height(growth percentile) Weight(growth percentile) 149/66 (BP 1 Location: Right arm, BP Patient Position: Sitting) 84 98.5 °F (36.9 °C) (Oral) 23 5' (1.524 m) 136 lb (61.7 kg) SpO2 BMI OB Status Smoking Status 100% 26.56 kg/m2 Postmenopausal Never Smoker BMI and BSA Data  Body Mass Index Body Surface Area  
 26.56 kg/m 2 1.62 m 2  
 Preferred Pharmacy Pharmacy Name Phone [address] [phone] Your Updated Medication List  
  
   
This list is accurate as of: 12/7/17  4:58 PM.  Always use your most recent med list.  
  
  
  
  
 lisinopril-hydroCHLOROthiazide 10-12.5 mg per tablet Commonly known as:  PRINZIDE, ZESTORETIC  
take 1 tablet by mouth once daily  
  
 lovastatin 40 mg tablet Commonly known as:  MEVACOR  
take 1 tablet by mouth once daily WITH A MEAL  
  
 metFORMIN 500 mg tablet Commonly known as:  GLUCOPHAGE  
take 1 tablet by mouth once daily -WITH DINNER  
  
 phenytoin  mg ER capsule Commonly known as:  DILANTIN ER Take 3 tabs on odd day and 2 tabs on even days We Performed the Following HEMOGLOBIN A1C WITH EAG [67118 CPT(R)] PHENYTOIN [id number] CPT(R)] [place] COLLECTION VENOUS BLOOD,VENIPUNCTURE K988120 CPT(R)] Follow-up Instructions Return in about 6 months (around 6/7/2018). Introducing [facility] [place] & HEALTH SERVICES! Chris Scherer introduces [employer] patient portal. Now you can access parts of your medical record, email your doctor's office, and request medication refills online. 1. In your internet browser, go to [email or web address] 2. Click on the First Time User? Click Here link in the Sign In box. You will see the New Member Sign Up page. 3. Enter your [employer] Access Code exactly as it appears below. You will not need to use this code after youve completed the sign-up process. If you do not sign up before the expiration date, you must request a new code. · [employer] Access Code: [email or web address] Expires: 3/7/2018  4:55 PM 
 
4. Enter the last four digits of your Social Security Number (xxxx) and Date of Birth (mm/dd/yyyy) as indicated and click Submit. You will be taken to the next sign-up page. 5. Create a [employer] ID.  This will be your [employer] login ID and cannot be changed, so think of one that is secure and easy to remember. 6. Create a [employer] password. You can change your password at any time. 7. Enter your Password Reset Question and Answer. This can be used at a later time if you forget your password. 8. Enter your e-mail address. You will receive e-mail notification when new information is available in [address] 9. Click Sign Up. You can now view and download portions of your medical record. 10. Click the Download Summary menu link to download a portable copy of your medical information. If you have questions, please visit the Frequently Asked Questions section of the [employer] website. Remember, [employer] is NOT to be used for urgent needs. For medical emergencies, dial 911. Now available from your iPhone and Android! Please provide this summary of care documentation to your next provider. Your primary care clinician is listed as Katelynn Short. If you have any questions after today's visit, please call [phone].

## 2017-12-07 NOTE — PROGRESS NOTES
Blood pressure elevation is noted when she first came in. It is drifting down towards normal now at 142/68. No adjustments will be made in her medications. Her blood sugar control is excellent without the use of medication with Hgb A1c less than 6. Dyslipidemia is controlled with statin. No seizures. Will confirm Dilantin level. She is approaching her ideal body weight. She'll return to the office in six months, sooner if she has any problems.

## 2017-12-07 NOTE — PROGRESS NOTES
.1. Have you been to the ER, urgent care clinic since your last visit? Hospitalized since your last visit? No    2. Have you seen or consulted any other health care providers outside of the [facility] since your last visit? Include any pap smears or colon screening.  No

## 2017-12-08 LAB
EST. AVERAGE GLUCOSE BLD GHB EST-MCNC: 105 MG/DL
HBA1C MFR BLD: 5.3 % (ref 4.8–5.6)
PHENYTOIN SERPL-MCNC: 21.6 UG/ML (ref 10–20)

## 2017-12-28 RX ORDER — METFORMIN HYDROCHLORIDE 500 MG/1
500 TABLET ORAL
Qty: 90 TAB | Refills: 11 | Status: SHIPPED | OUTPATIENT
Start: 2017-12-28 | End: 2019-01-02 | Stop reason: SDUPTHER

## 2018-01-09 RX ORDER — PHENYTOIN SODIUM 100 MG/1
CAPSULE, EXTENDED RELEASE ORAL
Qty: 90 CAP | Refills: 11 | Status: SHIPPED | OUTPATIENT
Start: 2018-01-09 | End: 2019-02-04 | Stop reason: SDUPTHER

## 2018-06-07 ENCOUNTER — OFFICE VISIT (OUTPATIENT)
Dept: INTERNAL MEDICINE CLINIC | Age: 73
End: 2018-06-07

## 2018-06-07 VITALS
DIASTOLIC BLOOD PRESSURE: 62 MMHG | SYSTOLIC BLOOD PRESSURE: 134 MMHG | HEIGHT: 61 IN | WEIGHT: 135.6 LBS | BODY MASS INDEX: 25.6 KG/M2 | HEART RATE: 78 BPM | TEMPERATURE: 96.6 F | RESPIRATION RATE: 16 BRPM

## 2018-06-07 DIAGNOSIS — R56.9 SEIZURES (HCC): ICD-10-CM

## 2018-06-07 DIAGNOSIS — F79 INTELLECTUAL DISABILITY: ICD-10-CM

## 2018-06-07 DIAGNOSIS — Z12.31 ENCOUNTER FOR SCREENING MAMMOGRAM FOR MALIGNANT NEOPLASM OF BREAST: ICD-10-CM

## 2018-06-07 DIAGNOSIS — G81.94 HEMIPARESIS OF LEFT NONDOMINANT SIDE, UNSPECIFIED HEMIPARESIS ETIOLOGY (HCC): ICD-10-CM

## 2018-06-07 DIAGNOSIS — E78.00 HYPERCHOLESTEROLEMIA: ICD-10-CM

## 2018-06-07 DIAGNOSIS — I10 ESSENTIAL HYPERTENSION: ICD-10-CM

## 2018-06-07 DIAGNOSIS — E11.9 TYPE 2 DIABETES MELLITUS WITHOUT COMPLICATION, WITHOUT LONG-TERM CURRENT USE OF INSULIN (HCC): Primary | ICD-10-CM

## 2018-06-07 NOTE — MR AVS SNAPSHOT
[address] , [place] [address] 
[phone] Patient: Florencia Wiseman MRN: [id number] YEX:[id number] Visit Information Date & Time Provider Department Dept. Phone Encounter #  
 6/7/2018  4:15 [phone] [id number] Follow-up Instructions Return in about 4 months (around 10/7/2018). Upcoming Health Maintenance Date Due  
 EYE EXAM RETINAL OR DILATED Q1 6/7/2018* FOBT Q 1 YEAR AGE 50-75 6/7/2018* Pneumococcal 65+ Low/Medium Risk (2 of 2 - PPSV23) 7/18/2018 MICROALBUMIN Q1 7/18/2018 LIPID PANEL Q1 7/18/2018 MEDICARE YEARLY EXAM 7/19/2018 Influenza Age 5 to Adult 8/1/2018 FOOT EXAM Q1 12/7/2018 HEMOGLOBIN A1C Q6M 12/7/2018 BREAST CANCER SCRN MAMMOGRAM 3/7/2019 GLAUCOMA SCREENING Q2Y 6/7/2020 DTaP/Tdap/Td series (2 - Td) 7/18/2027 *Topic was postponed. The date shown is not the original due date. Allergies as of 6/7/2018  Review Complete On: 6/7/2018 By: Joel Crandall No Known Allergies Current Immunizations  Never Reviewed No immunizations on file. Not reviewed this visit You Were Diagnosed With   
  
 Codes Comments Type 2 diabetes mellitus without complication, without long-term current use of insulin (HCC)    -  Primary ICD-10-CM: E11.9 ICD-9-CM: 250.00 Seizures ([facility]     ICD-10-CM: R56.9 ICD-9-CM: 780.39 Hemiparesis of left nondominant side, unspecified hemiparesis etiology ([facility]     ICD-10-CM: G81.94 
ICD-9-CM: 342.92 Essential hypertension     ICD-10-CM: I10 
ICD-9-CM: 401.9 Intellectual disability     ICD-10-CM: F79 
ICD-9-CM: 242 Hypercholesterolemia     ICD-10-CM: E78.00 ICD-9-CM: 272.0 Vitals BP Pulse Temp Resp Height(growth percentile) Weight(growth percentile) 134/62 78 96.6 °F (35.9 °C) (Oral) 16 5' 1\" (1.549 m) 135 lb 9.6 oz (61.5 kg) BMI OB Status Smoking Status 25.62 kg/m2 Postmenopausal Never Smoker BMI and BSA Data Body Mass Index Body Surface Area  
 25.62 kg/m 2 1.63 m 2 Preferred Pharmacy Pharmacy Name [facility] [address] [phone] Your Updated Medication List  
  
   
This list is accurate as of 6/7/18  5:26 PM.  Always use your most recent med list.  
  
  
  
  
 lisinopril-hydroCHLOROthiazide 10-12.5 mg per tablet Commonly known as:  PRINZIDE, ZESTORETIC  
take 1 tablet by mouth once daily  
  
 lovastatin 40 mg tablet Commonly known as:  MEVACOR  
take 1 tablet by mouth once daily WITH A MEAL  
  
 metFORMIN 500 mg tablet Commonly known as:  GLUCOPHAGE Take 1 Tab by mouth daily (with breakfast). phenytoin  mg ER capsule Commonly known as:  DILANTIN ER Take 3 tabs on odd day and 2 tabs on even days We Performed the Following HEMOGLOBIN A1C WITH EAG [35828 CPT(R)] PHENYTOIN [id number] CPT(R)] REFERRAL TO OPHTHALMOLOGY [REF57 Custom] Follow-up Instructions Return in about 4 months (around 10/7/2018). Referral Information Referral ID Referred By Referred To  
  
 [id number] Audrey LANDRY Not Available Visits Status Start Date End Date 1 New Request 6/7/18 6/7/19 If your referral has a status of pending review or denied, additional information will be sent to support the outcome of this decision. Introducing [facility] & HEALTH SERVICES! Kathleen Olmstead introduces [employer] patient portal. Now you can access parts of your medical record, email your doctor's office, and request medication refills online. 1. In your internet browser, go to [email or web address] 2. Click on the First Time User? Click Here link in the Sign In box. You will see the New Member Sign Up page. 3. Enter your [employer] Access Code exactly as it appears below.  You will not need to use this code after youve completed the sign-up process. If you do not sign up before the expiration date, you must request a new code. · [employer] Access Code: [email or web address] Expires: 9/5/2018  5:26 PM 
 
4. Enter the last four digits of your Social Security Number (xxxx) and Date of Birth (mm/dd/yyyy) as indicated and click Submit. You will be taken to the next sign-up page. 5. Create a [employer] ID. This will be your [employer] login ID and cannot be changed, so think of one that is secure and easy to remember. 6. Create a [employer] password. You can change your password at any time. 7. Enter your Password Reset Question and Answer. This can be used at a later time if you forget your password. 8. Enter your e-mail address. You will receive e-mail notification when new information is available in [address] 9. Click Sign Up. You can now view and download portions of your medical record. 10. Click the Download Summary menu link to download a portable copy of your medical information. If you have questions, please visit the Frequently Asked Questions section of the [employer] website. Remember, [employer] is NOT to be used for urgent needs. For medical emergencies, dial 911. Now available from your iPhone and Android! Please provide this summary of care documentation to your next provider. Your primary care clinician is listed as Radha Douglas. If you have any questions after today's visit, please call [phone].

## 2018-06-07 NOTE — PROGRESS NOTES
SPORTS MEDICINE AND PRIMARY CARE  Beni Martinez MD, [address] [address]  Phone:  [phone]  Fax: [phone]       Chief Complaint   Patient presents with    Hypertension   . SUBJECTIVE:    Rita Holland is a 67 y.o. female Patient returns today with her sister with known history of diabetes, primary hypertension, seizure disorder, left hemiparesis, dyslipidemia, intellectual disability, verbalizing very little, and is seen for evaluation. She hasn't had a seizure for years, her sister notes. Current Outpatient Prescriptions   Medication Sig Dispense Refill    phenytoin ER (DILANTIN ER) 100 mg ER capsule Take 3 tabs on odd day and 2 tabs on even days 90 Cap 11    metFORMIN (GLUCOPHAGE) 500 mg tablet Take 1 Tab by mouth daily (with breakfast). 90 Tab 11    lisinopril-hydroCHLOROthiazide (PRINZIDE, ZESTORETIC) 10-12.5 mg per tablet take 1 tablet by mouth once daily 30 Tab 11    lovastatin (MEVACOR) 40 mg tablet take 1 tablet by mouth once daily WITH A MEAL 30 Tab 11     Past Medical History:   Diagnosis Date    Colonoscopy refused 1-7-16    Diabetes [facility])     Fall     Fracture of rib of left side 7-23-15    Hemiparesis ([facility] [address] 1950    left    Hypercholesterolemia     Hypertension     Intellectual disability     S/P colonoscopy 4-17-03    n    Seizures ([facility] [address]      History reviewed. No pertinent surgical history.   No Known Allergies      REVIEW OF SYSTEMS:  General: negative for - chills or fever  ENT: negative for - headaches, nasal congestion or tinnitus  Respiratory: negative for - cough, hemoptysis, shortness of breath or wheezing  Cardiovascular : negative for - chest pain, edema, palpitations or shortness of breath  Gastrointestinal: negative for - abdominal pain, blood in stools, heartburn or nausea/vomiting  Genito-Urinary: no dysuria, trouble voiding, or hematuria  Musculoskeletal: negative for - gait disturbance, joint pain, joint stiffness or joint swelling  Neurological: no TIA or stroke symptoms  Hematologic: no bruises, no bleeding, no swollen glands  Integument: no lumps, mole changes, nail changes or rash  Endocrine: no malaise/lethargy or unexpected weight changes      Social History     Social History    Marital status: SINGLE     Spouse name: N/A    Number of children: N/A    Years of education: N/A     Social History Main Topics    Smoking status: Never Smoker    Smokeless tobacco: Never Used    Alcohol use No    Drug use: No    Sexual activity: Not Asked     Other Topics Concern    None     Social History Narrative    ** Merged History Encounter **     Medical History: DM2 non-insulin req. 1997primary HTN 1992Dyslipidemia 1992Mental retardation 1950Seizure d/o Left hemiparesis    Obesity     Gyn History: Last mammogram date 2013. Last menstrual perioddate . OB History: Total pregnancies 0. Surgical History: colonoscopy     Hospitalization/Major Diagnostic Procedure: seizure d/o     Family History: Mother: , DM complications, HTN, ASHDFather:  79 yrsChildren: Radha Orozco brother(s) , 4 sister(s) . Social History: Alcohol Use Patient does not use alcohol. Smoking Status Patient is a never smoker. Marital Status: Single. Lives w ith:    sister(s). Occupation/W ork: disabled. Education/School: high school. Islam: no.     Family History   Problem Relation Age of Onset    Diabetes Mother     No Known Problems Father        OBJECTIVE:    Visit Vitals    /62    Pulse 78    Temp 96.6 °F (35.9 °C) (Oral)    Resp 16    Ht 5' 1\" (1.549 m)    Wt 135 lb 9.6 oz (61.5 kg)    BMI 25.62 kg/m2     CONSTITUTIONAL: well , well nourished, appears age appropriate  EYES: perrla, eom intact  ENMT:moist mucous membranes, pharynx clear  NECK: supple.  Thyroid normal  RESPIRATORY: Chest: clear bilaterally   CARDIOVASCULAR: Heart: regular rate and rhythm  GASTROINTESTINAL: Abdomen: soft, bowel sounds active  HEMATOLOGIC: no pathological lymph nodes palpated  MUSCULOSKELETAL: Extremities: no edema, pulse 1+   INTEGUMENT: No unusual rashes or suspicious skin lesions noted. Nails appear normal.  NEUROLOGIC: non-focal exam   MENTAL STATUS: alert and oriented, appropriate affect           ASSESSMENT:  1. Type 2 diabetes mellitus without complication, without long-term current use of insulin (HCC)    2. Seizures ([address]    3. Hemiparesis of left nondominant side, unspecified hemiparesis etiology ([address]    4. Essential hypertension    5. Intellectual disability    6. Hypercholesterolemia    7. Encounter for screening mammogram for malignant neoplasm of breast       Dilantin level recheck confirms stability, but fortunately no seizures. Will assess blood sugar control with Hgb A1c. To that end we should also have her see an ophthalmologist and arrange for mammograms. She will return to see us in about four months. She's at her ideal body weight. PLAN:  .  Orders Placed This Encounter    BRITTANY MAMMO BI SCREENING INCL CAD    HEMOGLOBIN A1C WITH EAG    PHENYTOIN    REFERRAL TO OPHTHALMOLOGY       Follow-up Disposition:  Return in about 4 months (around 10/7/2018). ATTENTION:   This medical record was transcribed using an electronic medical records system. Although proofread, it may and can contain electronic and spelling errors. Other human spelling and other errors may be present. Corrections may be executed at a later time. Please feel free to contact us for any clarifications as needed.

## 2018-06-07 NOTE — ACP (ADVANCE CARE PLANNING)
Advance Care Planning (ACP) Provider Conversation Snapshot    Date of ACP Conversation: 06/07/18  Persons included in Conversation:  patient  Length of ACP Conversation in minutes:  <16 minutes (Non-Billable)    Authorized Decision Maker (if patient is incapable of making informed decisions):    This person is:   Healthcare Agent/Medical Power of  under Advance Directive          For Patients with Decision Making Capacity:   Values/Goals: Exploration of values, goals, and preferences if recovery is not expected, even with continued medical treatment in the event of:  Imminent death    Conversation Outcomes / Follow-Up Plan:   Entered DNR order (If yes, complete Durable DNR form)

## 2018-06-08 LAB
EST. AVERAGE GLUCOSE BLD GHB EST-MCNC: 100 MG/DL
HBA1C MFR BLD: 5.1 % (ref 4.8–5.6)
PHENYTOIN SERPL-MCNC: 25 UG/ML (ref 10–20)

## 2018-06-11 ENCOUNTER — TELEPHONE (OUTPATIENT)
Dept: INTERNAL MEDICINE CLINIC | Age: 73
End: 2018-06-11

## 2018-06-11 NOTE — TELEPHONE ENCOUNTER
Spoke with patient sister ms. murcia and ask dose of dilantin, she states she takes 3 capsules every day.  We ask patient to take way listed in chart 3 on odd days and 2 on even days

## 2018-06-27 ENCOUNTER — HOSPITAL ENCOUNTER (OUTPATIENT)
Dept: MAMMOGRAPHY | Age: 73
Discharge: HOME | End: 2018-06-27
Attending: INTERNAL MEDICINE
Payer: MEDICARE

## 2018-06-27 DIAGNOSIS — E11.9 TYPE 2 DIABETES MELLITUS WITHOUT COMPLICATION, WITHOUT LONG-TERM CURRENT USE OF INSULIN (HCC): ICD-10-CM

## 2018-06-27 DIAGNOSIS — Z12.31 ENCOUNTER FOR SCREENING MAMMOGRAM FOR MALIGNANT NEOPLASM OF BREAST: ICD-10-CM

## 2018-06-27 PROCEDURE — 77067 SCR MAMMO BI INCL CAD: CPT

## 2018-07-09 ENCOUNTER — HOSPITAL ENCOUNTER (OUTPATIENT)
Dept: MAMMOGRAPHY | Age: 73
Discharge: HOME | End: 2018-07-09
Payer: MEDICARE

## 2018-07-09 DIAGNOSIS — R92.8 ABNORMAL MAMMOGRAM: ICD-10-CM

## 2018-07-09 PROCEDURE — 77066 DX MAMMO INCL CAD BI: CPT

## 2018-08-08 ENCOUNTER — TELEPHONE (OUTPATIENT)
Dept: INTERNAL MEDICINE CLINIC | Age: 73
End: 2018-08-08

## 2018-08-08 NOTE — TELEPHONE ENCOUNTER
Emerson Pérez called from [facility] about Dr. Molina Pan patient Joby Rahman. It has to do with a breast biopsy.      Her call back number is: [phone]

## 2018-08-09 ENCOUNTER — TELEPHONE (OUTPATIENT)
Dept: INTERNAL MEDICINE CLINIC | Age: 73
End: 2018-08-09

## 2018-08-09 NOTE — TELEPHONE ENCOUNTER
Patient having breast bx and sister says she needs something to relax her. Per Dr. Dot Phelps patient given rx for ativan 0.5mg 1 tab 1 hr ac procedure, 1 tab upon arrival and may repeat x 2 #5.

## 2018-08-22 ENCOUNTER — HOSPITAL ENCOUNTER (OUTPATIENT)
Dept: MAMMOGRAPHY | Age: 73
Discharge: HOME | End: 2018-08-22
Attending: INTERNAL MEDICINE
Payer: MEDICARE

## 2018-08-22 ENCOUNTER — HOSPITAL ENCOUNTER (OUTPATIENT)
Dept: LAB | Age: 73
Discharge: HOME | End: 2018-08-22

## 2018-08-22 DIAGNOSIS — R92.8 ABNORMAL MAMMOGRAM: ICD-10-CM

## 2018-08-22 PROCEDURE — 77065 DX MAMMO INCL CAD UNI: CPT

## 2018-08-22 PROCEDURE — 74011250636 HC RX REV CODE- 250/636: Performed by: RADIOLOGY

## 2018-08-22 PROCEDURE — 88305 TISSUE EXAM BY PATHOLOGIST: CPT | Performed by: RADIOLOGY

## 2018-08-22 PROCEDURE — 74011000250 HC RX REV CODE- 250: Performed by: RADIOLOGY

## 2018-08-22 PROCEDURE — 77030030538 MAM STEREO VAC  BX BREAST LT 1ST LESION W/CLIP AND SPECIMEN

## 2018-08-22 RX ORDER — LIDOCAINE HYDROCHLORIDE AND EPINEPHRINE 10; 10 MG/ML; UG/ML
10 INJECTION, SOLUTION INFILTRATION; PERINEURAL ONCE
Status: COMPLETED | OUTPATIENT
Start: 2018-08-22 | End: 2018-08-22

## 2018-08-22 RX ORDER — LIDOCAINE HYDROCHLORIDE 10 MG/ML
15 INJECTION INFILTRATION; PERINEURAL
Status: COMPLETED | OUTPATIENT
Start: 2018-08-22 | End: 2018-08-22

## 2018-08-22 RX ADMIN — LIDOCAINE HYDROCHLORIDE 15 ML: 10 INJECTION, SOLUTION INFILTRATION; PERINEURAL at 12:15

## 2018-08-22 RX ADMIN — LIDOCAINE HYDROCHLORIDE AND EPINEPHRINE 100 MG: 10; 10 INJECTION, SOLUTION INFILTRATION; PERINEURAL at 12:15

## 2018-08-22 NOTE — DISCHARGE INSTRUCTIONS
Breast Biopsy Discharge Instructions    PAIN CONTROL     You may have mild discomfort; take 1-2 Tylenol every 4-6 hours as needed.  Do not take aspirin containing products or anti-inflammatory medications (Advil, Aleve, Motrin, Ibuprofen, etc.) for 24 hours.  Wearing a comfortable bra for support may help with discomfort. WOUND CARE      A small amount of bleeding or bruising at the biopsy site is normal. Watch for signs and symptoms of infections: skin warm to touch, yellowish drainage from wound, fever or severe pain.  Place an ice pack over the site hourly, 20-30 at a time for a few hours today.  The clear dressing is water resistant (you may shower, but do not allow the dressing to become saturated). You may remove the dressing in 48 hours. The steri-strips (small pieces of tape covering the incision) will fall off on their own in a few days. If the clear dressing irritates your skin or begins to peel off, you may remove it. Remember, if you remove the clear dressing before 48 hours, you should not get the site wet.  If at any point you have EXCESSIVE BLEEDING (saturating the gauze under the clear dressing) OR pain please call:    Daytime 7:30am-5:00pm: [facility] [phone]    After Hours:    [phone] (ask to speak to a radiologist)              or [address] [place] [address] [phone]    ACTIVITY     Do not participate in any strenuous activities for 24 hours (exercise, sports, housework, etc.).  You may resume work (light duty only) for the first 24 hours.  No heavy lifting with the arm on the affected side of your body. ADDITIONAL INSTRUCTIONS    We will call you with results on Friday or Monday in the afternoon.        YOUR TREATMENT TEAM TODAY WAS    MD: Kam Kim   RN: Mercedez Rod  Radiology Tech: Nasrin Higginbotham

## 2018-08-24 NOTE — PROGRESS NOTES
Left message on home and cell for Ms. Johnston Shannan to go over results. I will try her again Monday morning 8/27/18.

## 2018-08-27 NOTE — PROGRESS NOTES
Spoke with Maryruth Ramos (patient's sister) per patient's request. Conveyed benign biopsy results. Ms. Jody Gray states that patient's biopsy site is healing well and has experienced no complications. Advised her that the patient needs to have a follow up mammogram in 6 months on the right breast. Transferred Ms. Jody Gray to the appropriate person to set this appointment for her.

## 2018-11-09 RX ORDER — LISINOPRIL AND HYDROCHLOROTHIAZIDE 10; 12.5 MG/1; MG/1
TABLET ORAL
Qty: 30 TAB | Refills: 11 | Status: SHIPPED | OUTPATIENT
Start: 2018-11-09 | End: 2019-06-03 | Stop reason: SDUPTHER

## 2018-11-09 RX ORDER — LOVASTATIN 40 MG/1
TABLET ORAL
Qty: 30 TAB | Refills: 11 | Status: SHIPPED | OUTPATIENT
Start: 2018-11-09 | End: 2019-06-03 | Stop reason: SDUPTHER

## 2019-01-02 RX ORDER — METFORMIN HYDROCHLORIDE 500 MG/1
500 TABLET ORAL
Qty: 90 TAB | Refills: 2 | Status: SHIPPED | OUTPATIENT
Start: 2019-01-02 | End: 2019-10-01 | Stop reason: SDUPTHER

## 2019-02-04 RX ORDER — PHENYTOIN SODIUM 100 MG/1
CAPSULE, EXTENDED RELEASE ORAL
Qty: 90 CAP | Refills: 11 | Status: SHIPPED | OUTPATIENT
Start: 2019-02-04 | End: 2019-10-01 | Stop reason: SDUPTHER

## 2019-06-03 RX ORDER — LISINOPRIL AND HYDROCHLOROTHIAZIDE 10; 12.5 MG/1; MG/1
TABLET ORAL
Qty: 30 TAB | Refills: 0 | Status: SHIPPED | OUTPATIENT
Start: 2019-06-03 | End: 2019-10-01 | Stop reason: SDUPTHER

## 2019-06-03 RX ORDER — LOVASTATIN 40 MG/1
TABLET ORAL
Qty: 30 TAB | Refills: 0 | Status: SHIPPED | OUTPATIENT
Start: 2019-06-03 | End: 2019-10-01 | Stop reason: SDUPTHER

## 2019-10-02 RX ORDER — METFORMIN HYDROCHLORIDE 500 MG/1
500 TABLET ORAL
Qty: 10 TAB | Refills: 0 | Status: SHIPPED | OUTPATIENT
Start: 2019-10-02 | End: 2019-10-10 | Stop reason: SDUPTHER

## 2019-10-02 RX ORDER — LISINOPRIL AND HYDROCHLOROTHIAZIDE 10; 12.5 MG/1; MG/1
TABLET ORAL
Qty: 10 TAB | Refills: 0 | Status: SHIPPED | OUTPATIENT
Start: 2019-10-02 | End: 2019-10-10 | Stop reason: SDUPTHER

## 2019-10-02 RX ORDER — LOVASTATIN 40 MG/1
TABLET ORAL
Qty: 10 TAB | Refills: 0 | Status: SHIPPED | OUTPATIENT
Start: 2019-10-02 | End: 2019-10-10 | Stop reason: SDUPTHER

## 2019-10-02 RX ORDER — PHENYTOIN SODIUM 100 MG/1
CAPSULE, EXTENDED RELEASE ORAL
Qty: 50 CAP | Refills: 0 | Status: SHIPPED | OUTPATIENT
Start: 2019-10-02 | End: 2019-10-10 | Stop reason: SDUPTHER

## 2019-10-10 ENCOUNTER — OFFICE VISIT (OUTPATIENT)
Dept: INTERNAL MEDICINE CLINIC | Age: 74
End: 2019-10-10

## 2019-10-10 VITALS
HEIGHT: 61 IN | WEIGHT: 133.6 LBS | SYSTOLIC BLOOD PRESSURE: 137 MMHG | RESPIRATION RATE: 18 BRPM | DIASTOLIC BLOOD PRESSURE: 60 MMHG | BODY MASS INDEX: 25.22 KG/M2 | TEMPERATURE: 97.3 F | HEART RATE: 81 BPM | OXYGEN SATURATION: 98 %

## 2019-10-10 DIAGNOSIS — R56.9 SEIZURES (HCC): ICD-10-CM

## 2019-10-10 DIAGNOSIS — G81.94 HEMIPARESIS OF LEFT NONDOMINANT SIDE, UNSPECIFIED HEMIPARESIS ETIOLOGY (HCC): ICD-10-CM

## 2019-10-10 DIAGNOSIS — F79 INTELLECTUAL DISABILITY: ICD-10-CM

## 2019-10-10 DIAGNOSIS — I10 ESSENTIAL HYPERTENSION: Primary | ICD-10-CM

## 2019-10-10 DIAGNOSIS — E11.9 TYPE 2 DIABETES MELLITUS WITHOUT COMPLICATION, WITHOUT LONG-TERM CURRENT USE OF INSULIN (HCC): ICD-10-CM

## 2019-10-10 DIAGNOSIS — E78.00 HYPERCHOLESTEROLEMIA: ICD-10-CM

## 2019-10-10 DIAGNOSIS — R79.9 ABNORMAL FINDING OF BLOOD CHEMISTRY, UNSPECIFIED: ICD-10-CM

## 2019-10-10 RX ORDER — LOVASTATIN 40 MG/1
TABLET ORAL
Qty: 90 TAB | Refills: 11 | Status: SHIPPED | OUTPATIENT
Start: 2019-10-10 | End: 2020-12-04 | Stop reason: SDUPTHER

## 2019-10-10 RX ORDER — METFORMIN HYDROCHLORIDE 500 MG/1
500 TABLET ORAL
Qty: 10 TAB | Refills: 0 | Status: SHIPPED | OUTPATIENT
Start: 2019-10-10 | End: 2019-10-10 | Stop reason: SDUPTHER

## 2019-10-10 RX ORDER — LISINOPRIL AND HYDROCHLOROTHIAZIDE 10; 12.5 MG/1; MG/1
TABLET ORAL
Qty: 90 TAB | Refills: 3 | Status: SHIPPED | OUTPATIENT
Start: 2019-10-10 | End: 2020-12-30 | Stop reason: SDUPTHER

## 2019-10-10 RX ORDER — PHENYTOIN SODIUM 100 MG/1
CAPSULE, EXTENDED RELEASE ORAL
Qty: 90 CAP | Refills: 11 | Status: SHIPPED | OUTPATIENT
Start: 2019-10-10 | End: 2019-12-24 | Stop reason: SDUPTHER

## 2019-10-10 RX ORDER — METFORMIN HYDROCHLORIDE 500 MG/1
500 TABLET ORAL
Qty: 90 TAB | Refills: 3 | Status: SHIPPED | OUTPATIENT
Start: 2019-10-10 | End: 2020-10-08 | Stop reason: SDUPTHER

## 2019-10-10 NOTE — PROGRESS NOTES
SPORTS MEDICINE AND PRIMARY CARE  Paramjit Pacheco MD, [address] [address]  Phone:  [phone]  Fax: [phone]       Chief Complaint   Patient presents with    Hypertension   . SUBJECTIVE:    Reagan Sanchez is a 75 y.o. female Patient returns today after a long absence. Her sister's, who is her primary caregiver,  passed away with leukemia and ESRD after their 52 year marriage, (it has been very trying for her primary caregiver). Patient herself has no complaints. She has a known history of hypertension, intellectual disability, seizure disorder, dyslipidemia, hemiparesis, diabetes, and is seen for evaluation. Current Outpatient Medications   Medication Sig Dispense Refill    metFORMIN (GLUCOPHAGE) 500 mg tablet Take 1 Tab by mouth daily (with breakfast). 90 Tab 3    lisinopril-hydroCHLOROthiazide (PRINZIDE, ZESTORETIC) 10-12.5 mg per tablet take 1 tablet by mouth once daily. PATIENT NEEDS TO BE SEEN. 90 Tab 3    phenytoin ER (DILANTIN ER) 100 mg ER capsule Take 3 tabs on odd day and 2 tabs on even days 90 Cap 11    lovastatin (MEVACOR) 40 mg tablet take 1 tablet by mouth once daily WITH A MEAL. PATIENT NEEDS AN APPT BEFORE REFILLS 90 Tab 11     Past Medical History:   Diagnosis Date    Colonoscopy refused 1-7-16    Diabetes [facility])     Fall     Fracture of rib of left side 7-23-15    Hemiparesis ([facility] [address] 1950    left    Hypercholesterolemia     Hypertension     Intellectual disability     S/P colonoscopy 4-17-03    n    Seizures ([facility] [address]      No past surgical history on file.   No Known Allergies      REVIEW OF SYSTEMS:  General: negative for - chills or fever  ENT: negative for - headaches, nasal congestion or tinnitus  Respiratory: negative for - cough, hemoptysis, shortness of breath or wheezing  Cardiovascular : negative for - chest pain, edema, palpitations or shortness of breath  Gastrointestinal: negative for - abdominal pain, blood in stools, heartburn or nausea/vomiting  Genito-Urinary: no dysuria, trouble voiding, or hematuria  Musculoskeletal: negative for - gait disturbance, joint pain, joint stiffness or joint swelling  Neurological: no TIA or stroke symptoms  Hematologic: no bruises, no bleeding, no swollen glands  Integument: no lumps, mole changes, nail changes or rash  Endocrine: no malaise/lethargy or unexpected weight changes      Social History     Socioeconomic History    Marital status: SINGLE     Spouse name: Not on file    Number of children: Not on file    Years of education: Not on file    Highest education level: Not on file   Tobacco Use    Smoking status: Never Smoker    Smokeless tobacco: Never Used   Substance and Sexual Activity    Alcohol use: No    Drug use: No   Social History Narrative    ** Merged History Encounter **     Medical History: DM2 non-insulin req. primary HTN Dyslipidemia 1992Mental retardation 1950Seizure d/o Left hemiparesis    Obesity     Gyn History: Last mammogram date 2013. Last menstrual perioddate . OB History: Total pregnancies 0. Surgical History: colonoscopy     Hospitalization/Major Diagnostic Procedure: seizure d/o     Family History: Mother: , DM complications, HTN, ASHDFather:  79 yrsChildren: Briseida Steiner brother(s) , 4 sister(s) . Social History: Alcohol Use Patient does not use alcohol. Smoking Status Patient is a never smoker. Marital Status: Single. Lives w ith:    sister(s). Occupation/W ork: disabled. Education/School: high school.  Spiritism: no.     Family History   Problem Relation Age of Onset    Diabetes Mother     No Known Problems Father        OBJECTIVE:    Visit Vitals  /60 (BP 1 Location: Right arm, BP Patient Position: Sitting)   Pulse 81   Temp 97.3 °F (36.3 °C) (Oral)   Resp 18   Ht 5' 1\" (1.549 m)   Wt 133 lb 9.6 oz (60.6 kg)   SpO2 98%   BMI 25.24 kg/m²     CONSTITUTIONAL: well , well nourished, appears age appropriate  EYES: perrla, eom intact  ENMT:moist mucous membranes, pharynx clear  NECK: supple. Thyroid normal  RESPIRATORY: Chest: clear bilaterally   CARDIOVASCULAR: Heart: regular rate and rhythm  GASTROINTESTINAL: Abdomen: soft, bowel sounds active  HEMATOLOGIC: no pathological lymph nodes palpated  MUSCULOSKELETAL: Extremities: no edema, pulse 1+   INTEGUMENT: No unusual rashes or suspicious skin lesions noted. Nails appear normal.  NEUROLOGIC: non-focal exam   MENTAL STATUS: alert and oriented, appropriate affect           ASSESSMENT:  1. Essential hypertension    2. Seizures ([address]    3. Abnormal finding of blood chemistry, unspecified     4. Intellectual disability    5. Hypercholesterolemia    6. Hemiparesis of left nondominant side, unspecified hemiparesis etiology ([address]    7. Type 2 diabetes mellitus without complication, without long-term current use of insulin (HCC)      Blood pressure control is at goal with new antihypertensive medication. She has had no seizures since we last saw her. Will check a Dilantin level. She has a known history of dyslipidemia, for which we will also check her cholesterol and titrate the statin to induce a goal response. Hemiparesis is unchanged. Diabetes controlled with oral agent Metformin. Will control with hemoglobin A1c. She will be back to see us in four to six months, sooner if she has any problems. I have discussed the diagnosis with the patient and the intended plan as seen in the  orders above. The patient understands and agees with the plan. The patient has   received an after visit summary and questions were answered concerning  future plans  Patient labs and/or xrays were reviewed  Past records were reviewed.     PLAN:  .  Orders Placed This Encounter    URINALYSIS W/ RFLX MICROSCOPIC    CBC WITH AUTOMATED DIFF    METABOLIC PANEL, COMPREHENSIVE    LIPID PANEL    TSH 3RD GENERATION    HEMOGLOBIN A1C WITH EAG    PHENYTOIN    DISCONTD: metFORMIN (GLUCOPHAGE) 500 mg tablet    metFORMIN (GLUCOPHAGE) 500 mg tablet    lisinopril-hydroCHLOROthiazide (PRINZIDE, ZESTORETIC) 10-12.5 mg per tablet    phenytoin ER (DILANTIN ER) 100 mg ER capsule    lovastatin (MEVACOR) 40 mg tablet       Follow-up and Dispositions    · Return in about 4 months (around 2/10/2020). ATTENTION:   This medical record was transcribed using an electronic medical records system. Although proofread, it may and can contain electronic and spelling errors. Other human spelling and other errors may be present. Corrections may be executed at a later time. Please feel free to contact us for any clarifications as needed.

## 2019-10-10 NOTE — PROGRESS NOTES
1. Have you been to the ER, urgent care clinic since your last visit? Hospitalized since your last visit? No    2. Have you seen or consulted any other health care providers outside of the [address] [place] [address] since your last visit? Include any pap smears or colon screening.  No

## 2019-10-11 LAB
ALBUMIN SERPL-MCNC: 4 G/DL (ref 3.5–4.8)
ALBUMIN/GLOB SERPL: 1.3 {RATIO} (ref 1.2–2.2)
ALP SERPL-CCNC: 58 IU/L (ref 39–117)
ALT SERPL-CCNC: 15 IU/L (ref 0–32)
APPEARANCE UR: CLEAR
AST SERPL-CCNC: 12 IU/L (ref 0–40)
BACTERIA #/AREA URNS HPF: ABNORMAL /[HPF]
BASOPHILS # BLD AUTO: 0 X10E3/UL (ref 0–0.2)
BASOPHILS NFR BLD AUTO: 0 %
BILIRUB SERPL-MCNC: <0.2 MG/DL (ref 0–1.2)
BILIRUB UR QL STRIP: NEGATIVE
BUN SERPL-MCNC: 16 MG/DL (ref 8–27)
BUN/CREAT SERPL: 23 (ref 12–28)
CALCIUM SERPL-MCNC: 8.9 MG/DL (ref 8.7–10.3)
CASTS URNS QL MICRO: ABNORMAL /LPF
CHLORIDE SERPL-SCNC: 99 MMOL/L (ref 96–106)
CHOLEST SERPL-MCNC: 182 MG/DL (ref 100–199)
CO2 SERPL-SCNC: 25 MMOL/L (ref 20–29)
COLOR UR: YELLOW
CREAT SERPL-MCNC: 0.71 MG/DL (ref 0.57–1)
EOSINOPHIL # BLD AUTO: 0.2 X10E3/UL (ref 0–0.4)
EOSINOPHIL NFR BLD AUTO: 3 %
EPI CELLS #/AREA URNS HPF: ABNORMAL /HPF (ref 0–10)
ERYTHROCYTE [DISTWIDTH] IN BLOOD BY AUTOMATED COUNT: 11.9 % (ref 12.3–15.4)
EST. AVERAGE GLUCOSE BLD GHB EST-MCNC: 108 MG/DL
GLOBULIN SER CALC-MCNC: 3 G/DL (ref 1.5–4.5)
GLUCOSE SERPL-MCNC: 105 MG/DL (ref 65–99)
GLUCOSE UR QL: NEGATIVE
HBA1C MFR BLD: 5.4 % (ref 4.8–5.6)
HCT VFR BLD AUTO: 35.4 % (ref 34–46.6)
HDLC SERPL-MCNC: 87 MG/DL
HGB BLD-MCNC: 12.4 G/DL (ref 11.1–15.9)
HGB UR QL STRIP: ABNORMAL
IMM GRANULOCYTES # BLD AUTO: 0 X10E3/UL (ref 0–0.1)
IMM GRANULOCYTES NFR BLD AUTO: 0 %
KETONES UR QL STRIP: NEGATIVE
LDLC SERPL CALC-MCNC: 88 MG/DL (ref 0–99)
LEUKOCYTE ESTERASE UR QL STRIP: ABNORMAL
LYMPHOCYTES # BLD AUTO: 2 X10E3/UL (ref 0.7–3.1)
LYMPHOCYTES NFR BLD AUTO: 34 %
MCH RBC QN AUTO: 31.3 PG (ref 26.6–33)
MCHC RBC AUTO-ENTMCNC: 35 G/DL (ref 31.5–35.7)
MCV RBC AUTO: 89 FL (ref 79–97)
MICRO URNS: ABNORMAL
MONOCYTES # BLD AUTO: 0.5 X10E3/UL (ref 0.1–0.9)
MONOCYTES NFR BLD AUTO: 8 %
MUCOUS THREADS URNS QL MICRO: PRESENT
NEUTROPHILS # BLD AUTO: 3.4 X10E3/UL (ref 1.4–7)
NEUTROPHILS NFR BLD AUTO: 55 %
NITRITE UR QL STRIP: NEGATIVE
PH UR STRIP: 6 [PH] (ref 5–7.5)
PHENYTOIN SERPL-MCNC: 18 UG/ML (ref 10–20)
PLATELET # BLD AUTO: 235 X10E3/UL (ref 150–450)
POTASSIUM SERPL-SCNC: 4 MMOL/L (ref 3.5–5.2)
PROT SERPL-MCNC: 7 G/DL (ref 6–8.5)
PROT UR QL STRIP: NEGATIVE
RBC # BLD AUTO: 3.96 X10E6/UL (ref 3.77–5.28)
RBC #/AREA URNS HPF: ABNORMAL /HPF (ref 0–2)
SODIUM SERPL-SCNC: 140 MMOL/L (ref 134–144)
SP GR UR: 1.01 (ref 1–1.03)
TRIGL SERPL-MCNC: 37 MG/DL (ref 0–149)
TSH SERPL DL<=0.005 MIU/L-ACNC: 0.58 UIU/ML (ref 0.45–4.5)
UROBILINOGEN UR STRIP-MCNC: 0.2 MG/DL (ref 0.2–1)
VLDLC SERPL CALC-MCNC: 7 MG/DL (ref 5–40)
WBC # BLD AUTO: 6 X10E3/UL (ref 3.4–10.8)
WBC #/AREA URNS HPF: ABNORMAL /HPF (ref 0–5)

## 2019-11-18 ENCOUNTER — HOSPITAL ENCOUNTER (OUTPATIENT)
Dept: MAMMOGRAPHY | Age: 74
Discharge: HOME | End: 2019-11-18
Attending: INTERNAL MEDICINE
Payer: MEDICARE

## 2019-11-18 DIAGNOSIS — Z12.31 VISIT FOR SCREENING MAMMOGRAM: ICD-10-CM

## 2019-11-18 PROCEDURE — 77067 SCR MAMMO BI INCL CAD: CPT

## 2019-12-24 RX ORDER — PHENYTOIN SODIUM 100 MG/1
CAPSULE, EXTENDED RELEASE ORAL
Qty: 225 CAP | Refills: 3 | Status: SHIPPED | OUTPATIENT
Start: 2019-12-24 | End: 2020-12-30 | Stop reason: SDUPTHER

## 2020-09-25 ENCOUNTER — OFFICE VISIT (OUTPATIENT)
Dept: INTERNAL MEDICINE CLINIC | Age: 75
End: 2020-09-25
Payer: MEDICARE

## 2020-09-25 VITALS
SYSTOLIC BLOOD PRESSURE: 139 MMHG | RESPIRATION RATE: 20 BRPM | OXYGEN SATURATION: 98 % | BODY MASS INDEX: 25.05 KG/M2 | HEART RATE: 71 BPM | WEIGHT: 132.7 LBS | HEIGHT: 61 IN | DIASTOLIC BLOOD PRESSURE: 72 MMHG | TEMPERATURE: 98.1 F

## 2020-09-25 DIAGNOSIS — Z00.00 MEDICARE ANNUAL WELLNESS VISIT, SUBSEQUENT: Primary | ICD-10-CM

## 2020-09-25 DIAGNOSIS — R56.9 SEIZURES (HCC): ICD-10-CM

## 2020-09-25 DIAGNOSIS — I10 ESSENTIAL HYPERTENSION: ICD-10-CM

## 2020-09-25 DIAGNOSIS — G81.94 HEMIPARESIS OF LEFT NONDOMINANT SIDE, UNSPECIFIED HEMIPARESIS ETIOLOGY (HCC): ICD-10-CM

## 2020-09-25 DIAGNOSIS — E11.9 TYPE 2 DIABETES MELLITUS WITHOUT COMPLICATION, WITHOUT LONG-TERM CURRENT USE OF INSULIN (HCC): ICD-10-CM

## 2020-09-25 DIAGNOSIS — Z13.31 SCREENING FOR DEPRESSION: ICD-10-CM

## 2020-09-25 DIAGNOSIS — F79 INTELLECTUAL DISABILITY: ICD-10-CM

## 2020-09-25 DIAGNOSIS — Z12.31 ENCOUNTER FOR SCREENING MAMMOGRAM FOR MALIGNANT NEOPLASM OF BREAST: ICD-10-CM

## 2020-09-25 DIAGNOSIS — E78.00 HYPERCHOLESTEROLEMIA: ICD-10-CM

## 2020-09-25 PROCEDURE — 1101F PT FALLS ASSESS-DOCD LE1/YR: CPT | Performed by: INTERNAL MEDICINE

## 2020-09-25 PROCEDURE — G8399 PT W/DXA RESULTS DOCUMENT: HCPCS | Performed by: INTERNAL MEDICINE

## 2020-09-25 PROCEDURE — 3046F HEMOGLOBIN A1C LEVEL >9.0%: CPT | Performed by: INTERNAL MEDICINE

## 2020-09-25 PROCEDURE — G0439 PPPS, SUBSEQ VISIT: HCPCS | Performed by: INTERNAL MEDICINE

## 2020-09-25 PROCEDURE — G8754 DIAS BP LESS 90: HCPCS | Performed by: INTERNAL MEDICINE

## 2020-09-25 PROCEDURE — G8432 DEP SCR NOT DOC, RNG: HCPCS | Performed by: INTERNAL MEDICINE

## 2020-09-25 PROCEDURE — G8752 SYS BP LESS 140: HCPCS | Performed by: INTERNAL MEDICINE

## 2020-09-25 PROCEDURE — G8536 NO DOC ELDER MAL SCRN: HCPCS | Performed by: INTERNAL MEDICINE

## 2020-09-25 PROCEDURE — 2022F DILAT RTA XM EVC RTNOPTHY: CPT | Performed by: INTERNAL MEDICINE

## 2020-09-25 PROCEDURE — 3017F COLORECTAL CA SCREEN DOC REV: CPT | Performed by: INTERNAL MEDICINE

## 2020-09-25 PROCEDURE — 99213 OFFICE O/P EST LOW 20 MIN: CPT | Performed by: INTERNAL MEDICINE

## 2020-09-25 PROCEDURE — 1090F PRES/ABSN URINE INCON ASSESS: CPT | Performed by: INTERNAL MEDICINE

## 2020-09-25 PROCEDURE — G8427 DOCREV CUR MEDS BY ELIG CLIN: HCPCS | Performed by: INTERNAL MEDICINE

## 2020-09-25 PROCEDURE — G8419 CALC BMI OUT NRM PARAM NOF/U: HCPCS | Performed by: INTERNAL MEDICINE

## 2020-09-25 NOTE — PROGRESS NOTES
SPORTS MEDICINE AND PRIMARY CARE  Kenia Hall MD, [address] [address]  Phone:  [phone]  Fax: [phone]      Chief Complaint   Patient presents with    Annual Wellness Visit         SUBECTIVE:    Guido Castillo is a 75 y.o. female Patient returns today with known history of type 2 diabetes, primary hypertension, seizure disorder, dyslipidemia, and left hemiparesis and is seen for evaluation. She is coming in with her sister, who tells us that she unfortunately lost her  about a year ago now. Her sister is also not working because of the [place] virus. Ines, however, has no new complaints. She had had no seizures since we last saw her. The  is closed, so she is at home with her sister. Patient is seen for evaluation. Current Outpatient Medications   Medication Sig Dispense Refill    phenytoin ER (DILANTIN ER) 100 mg ER capsule Take 3 tabs on odd day and 2 tabs on even days 225 Cap 3    metFORMIN (GLUCOPHAGE) 500 mg tablet Take 1 Tab by mouth daily (with breakfast). 90 Tab 3    lisinopril-hydroCHLOROthiazide (PRINZIDE, ZESTORETIC) 10-12.5 mg per tablet take 1 tablet by mouth once daily. PATIENT NEEDS TO BE SEEN. 90 Tab 3    lovastatin (MEVACOR) 40 mg tablet take 1 tablet by mouth once daily WITH A MEAL. PATIENT NEEDS AN APPT BEFORE REFILLS 90 Tab 11     Past Medical History:   Diagnosis Date    Colonoscopy refused 1-7-16    Diabetes [facility])     Fall     Fracture of rib of left side 7-23-15    Hemiparesis ([facility] [address] 1950    left    Hypercholesterolemia     Hypertension     Intellectual disability     S/P colonoscopy 4-17-03    n    Seizures ([facility] [address]      Past Surgical History:   Procedure Laterality Date    HX BREAST BIOPSY Left 2018    needle benign     No Known Allergies    REVIEW OF SYSTEMS:   No chest pain, no shortness of breath.         Social History     Socioeconomic History    Marital status: SINGLE     Spouse name: Not on file    Number of children: Not on file    Years of education: Not on file    Highest education level: Not on file   Tobacco Use    Smoking status: Never Smoker    Smokeless tobacco: Never Used   Substance and Sexual Activity    Alcohol use: No    Drug use: No   Social History Narrative    ** Merged History Encounter **     Medical History: DM2 non-insulin req. 1997primary HTN 1992Dyslipidemia 1992Mental retardation 1950Seizure d/o Left hemiparesis    Obesity     Gyn History: Last mammogram date 2013. Last menstrual perioddate . OB History: Total pregnancies 0. Surgical History: colonoscopy     Hospitalization/Major Diagnostic Procedure: seizure d/o     Family History: Mother: , DM complications, HTN, ASHDFather:  79 yrsChildren: Thelma Fuentes brother(s) , 4 sister(s) . Social History: Alcohol Use Patient does not use alcohol. Smoking Status Patient is a never smoker. Marital Status: Single. Lives w ith:    sister(s). Occupation/W ork: disabled. Education/School: high school. Church: no.   r  Family History   Problem Relation Age of Onset    Diabetes Mother     No Known Problems Father        OBJECTIVE:  Visit Vitals  /72   Pulse 71   Temp 98.1 °F (36.7 °C) (Oral)   Resp 20   Ht 5' 1\" (1.549 m)   Wt 132 lb 11.2 oz (60.2 kg)   SpO2 98%   BMI 25.07 kg/m²     ENT: perrla,  eom intact  NECK: supple. Thyroid normal  CHEST: clear to ascultation and percussion   HEART: regular rate and rhythm  ABD: soft, bowel sounds active  EXTREMITIES: no edema, pulse 1+     No visits with results within 3 Month(s) from this visit.    Latest known visit with results is:   Office Visit on 10/10/2019   Component Date Value Ref Range Status    Specific Gravity 10/10/2019 1.015  1.005 - 1.030 Final    pH (UA) 10/10/2019 6.0  5.0 - 7.5 Final    Color 10/10/2019 Yellow  Yellow Final    Appearance 10/10/2019 Clear  Clear Final    Leukocyte Esterase 10/10/2019 2+* Negative Final    Protein 10/10/2019 Negative  Negative/Trace Final    Glucose 10/10/2019 Negative  Negative Final    Ketone 10/10/2019 Negative  Negative Final    Blood 10/10/2019 Trace* Negative Final    Bilirubin 10/10/2019 Negative  Negative Final    Urobilinogen 10/10/2019 0.2  0.2 - 1.0 mg/dL Final    Nitrites 10/10/2019 Negative  Negative Final    Microscopic Examination 10/10/2019 See additional order   Final    Microscopic was indicated and was performed.  WBC 10/10/2019 6.0  3.4 - 10.8 x10E3/uL Final    RBC 10/10/2019 3.96  3.77 - 5.28 x10E6/uL Final    HGB 10/10/2019 12.4  11.1 - 15.9 g/dL Final    HCT 10/10/2019 35.4  34.0 - 46.6 % Final    MCV 10/10/2019 89  79 - 97 fL Final    MCH 10/10/2019 31.3  26.6 - 33.0 pg Final    MCHC 10/10/2019 35.0  31.5 - 35.7 g/dL Final    RDW 10/10/2019 11.9* 12.3 - 15.4 % Final    PLATELET 69/01/1328 267  150 - 450 x10E3/uL Final    NEUTROPHILS 10/10/2019 55  Not Estab. % Final    Lymphocytes 10/10/2019 34  Not Estab. % Final    MONOCYTES 10/10/2019 8  Not Estab. % Final    EOSINOPHILS 10/10/2019 3  Not Estab. % Final    BASOPHILS 10/10/2019 0  Not Estab. % Final    ABS. NEUTROPHILS 10/10/2019 3.4  1.4 - 7.0 x10E3/uL Final    Abs Lymphocytes 10/10/2019 2.0  0.7 - 3.1 x10E3/uL Final    ABS. MONOCYTES 10/10/2019 0.5  0.1 - 0.9 x10E3/uL Final    ABS. EOSINOPHILS 10/10/2019 0.2  0.0 - 0.4 x10E3/uL Final    ABS. BASOPHILS 10/10/2019 0.0  0.0 - 0.2 x10E3/uL Final    IMMATURE GRANULOCYTES 10/10/2019 0  Not Estab. % Final    ABS. IMM. GRANS.  10/10/2019 0.0  0.0 - 0.1 x10E3/uL Final    Glucose 10/10/2019 105* 65 - 99 mg/dL Final    BUN 10/10/2019 16  8 - 27 mg/dL Final    Creatinine 10/10/2019 0.71  0.57 - 1.00 mg/dL Final    GFR est non-AA 10/10/2019 84  >59 mL/min/1.73 Final    GFR est AA 10/10/2019 97  >59 mL/min/1.73 Final    BUN/Creatinine ratio 10/10/2019 23  12 - 28 Final    Sodium 10/10/2019 140  134 - 144 mmol/L Final    Potassium 10/10/2019 4.0  3.5 - 5.2 mmol/L Final    Chloride 10/10/2019 99  96 - 106 mmol/L Final    CO2 10/10/2019 25  20 - 29 mmol/L Final    Calcium 10/10/2019 8.9  8.7 - 10.3 mg/dL Final    Protein, total 10/10/2019 7.0  6.0 - 8.5 g/dL Final    Albumin 10/10/2019 4.0  3.5 - 4.8 g/dL Final    GLOBULIN, TOTAL 10/10/2019 3.0  1.5 - 4.5 g/dL Final    A-G Ratio 10/10/2019 1.3  1.2 - 2.2 Final    Bilirubin, total 10/10/2019 <0.2  0.0 - 1.2 mg/dL Final    Alk. phosphatase 10/10/2019 58  39 - 117 IU/L Final    AST (SGOT) 10/10/2019 12  0 - 40 IU/L Final    ALT (SGPT) 10/10/2019 15  0 - 32 IU/L Final    Cholesterol, total 10/10/2019 182  100 - 199 mg/dL Final    Triglyceride 10/10/2019 37  0 - 149 mg/dL Final    HDL Cholesterol 10/10/2019 87  >39 mg/dL Final    VLDL, calculated 10/10/2019 7  5 - 40 mg/dL Final    LDL, calculated 10/10/2019 88  0 - 99 mg/dL Final    TSH 10/10/2019 0.582  0.450 - 4.500 uIU/mL Final    Hemoglobin A1c 10/10/2019 5.4  4.8 - 5.6 % Final    Comment:          Prediabetes: 5.7 - 6.4           Diabetes: >6.4           Glycemic control for adults with diabetes: <7.0      Estimated average glucose 10/10/2019 108  mg/dL Final    Phenytoin 10/10/2019 18.0  10.0 - 20.0 ug/mL Final    Comment:                                 Detection Limit =  0.8                            <0.8 Indicates None Detected      WBC 10/10/2019 6-10* 0 - 5 /hpf Final    RBC 10/10/2019 0-2  0 - 2 /hpf Final    Epithelial cells 10/10/2019 0-10  0 - 10 /hpf Final    Casts 10/10/2019 None seen  None seen /lpf Final    Mucus 10/10/2019 Present  Not Estab. Final    Bacteria 10/10/2019 Few  None seen/Few Final          ASSESSMENT:  1. Medicare annual wellness visit, subsequent    2. Type 2 diabetes mellitus without complication, without long-term current use of insulin (HCC)    3. Seizures ([address]    4. Hemiparesis of left nondominant side, unspecified hemiparesis etiology ([address]    5. Intellectual disability    6. Hypercholesterolemia    7. Essential hypertension    8. Encounter for screening mammogram for malignant neoplasm of breast     9. Screening for depression      We will assess blood sugar control. She is currently on Metformin daily. She is on Dilantin, three tablets on odd days, two tablets on even days, for her seizure disorder. Fortunately, she has had no seizures. She has had left hemiparesis, unchanged. She is on Lovastatin for the cholesterol, for which we will also check today. BP control is at goal on Lisinopril/Hydrochlorothiazide 10/12.5. Medically I think she is doing well and she will be back to see us in four to six months, sooner if she has any problems. I have discussed the diagnosis with the patient and the intended plan as seen in the  orders above. The patient understands and agees with the plan. The patient has   received an after visit summary and questions were answered concerning  future plans  Patient labs and/or xrays were reviewed  Past records were reviewed. PLAN:  .  Orders Placed This Encounter    Depression Screen Annual    BRITTANY MAMMO BI SCREENING INCL CAD    URINALYSIS W/ RFLX MICROSCOPIC    CBC WITH AUTOMATED DIFF    METABOLIC PANEL, COMPREHENSIVE    LIPID PANEL    TSH 3RD GENERATION    HEMOGLOBIN A1C WITH EAG    PHENYTOIN       Follow-up and Dispositions    · Return in about 6 months (around 3/25/2021). ATTENTION:   This medical record was transcribed using an electronic medical records system. Although proofread, it may and can contain electronic and spelling errors. Other human spelling and other errors may be present. Corrections may be executed at a later time. Please feel free to contact us for any clarifications as needed.

## 2020-09-25 NOTE — PATIENT INSTRUCTIONS
Medicare Wellness Visit, Female     The best way to live healthy is to have a lifestyle where you eat a well-balanced diet, exercise regularly, limit alcohol use, and quit all forms of tobacco/nicotine, if applicable. Regular preventive services are another way to keep healthy. Preventive services (vaccines, screening tests, monitoring & exams) can help personalize your care plan, which helps you manage your own care. Screening tests can find health problems at the earliest stages, when they are easiest to treat. [address] follows the current, evidence-based guidelines published by the [facility] Fermin Romelia ([facility]) when recommending preventive services for our patients. Because we follow these guidelines, sometimes recommendations change over time as research supports it. (For example, mammograms used to be recommended annually. Even though Medicare will still pay for an annual mammogram, the newer guidelines recommend a mammogram every two years for women of average risk.)  Of course, you and your doctor may decide to screen more often for some diseases, based on your risk and your health status. Preventive services for you include:  - Medicare offers their members a free annual wellness visit, which is time for you and your primary care provider to discuss and plan for your preventive service needs. Take advantage of this benefit every year!  -All adults over the age of 72 should receive the recommended pneumonia vaccines. Current USPSTF guidelines recommend a series of two vaccines for the best pneumonia protection.   -All adults should have a flu vaccine yearly and a tetanus vaccine every 10 years. All adults age 48 and older should receive a shingles vaccine once in their lifetime.    -A bone mass density test is recommended when a woman turns 65 to screen for osteoporosis. This test is only recommended one time, as a screening.  Some providers will use this same test as a disease monitoring tool if you already have osteoporosis. -All adults age 38-68 who are overweight should have a diabetes screening test once every three years.   -Other screening tests and preventive services for persons with diabetes include: an eye exam to screen for diabetic retinopathy, a kidney function test, a foot exam, and stricter control over your cholesterol.   -Cardiovascular screening for adults with routine risk involves an electrocardiogram (ECG) at intervals determined by your doctor.   -Colorectal cancer screenings should be done for adults age 54-65 with no increased risk factors for colorectal cancer. There are a number of acceptable methods of screening for this type of cancer. Each test has its own benefits and drawbacks. Discuss with your doctor what is most appropriate for you during your annual wellness visit. The different tests include: colonoscopy (considered the best screening method), a fecal occult blood test, a fecal DNA test, and sigmoidoscopy. -Breast cancer screenings are recommended every other year for women of normal risk, age 54-69.  -Cervical cancer screenings for women over age 72 are only recommended with certain risk factors.   -All adults born between [facility] should be screened once for Hepatitis C. Here is a list of your current Health Maintenance items (your personalized list of preventive services) with a due date:  Health Maintenance Due   Topic Date Due    Eye Exam  04/05/2018    Albumin Urine Test  07/18/2018    Annual Well Visit  07/19/2018    Diabetic Foot Care  02/05/2020    Hemoglobin A1C    04/10/2020    Glaucoma Screening   06/07/2020         Medicare Wellness Visit, Female     The best way to live healthy is to have a lifestyle where you eat a well-balanced diet, exercise regularly, limit alcohol use, and quit all forms of tobacco/nicotine, if applicable. Regular preventive services are another way to keep healthy.  Preventive services (vaccines, screening tests, monitoring & exams) can help personalize your care plan, which helps you manage your own care. Screening tests can find health problems at the earliest stages, when they are easiest to treat. [facility] follows the current, evidence-based guidelines published by the [facility] Fermin León ([facility]) when recommending preventive services for our patients. Because we follow these guidelines, sometimes recommendations change over time as research supports it. (For example, mammograms used to be recommended annually. Even though Medicare will still pay for an annual mammogram, the newer guidelines recommend a mammogram every two years for women of average risk). Of course, you and your doctor may decide to screen more often for some diseases, based on your risk and your co-morbidities (chronic disease you are already diagnosed with). Preventive services for you include:  - Medicare offers their members a free annual wellness visit, which is time for you and your primary care provider to discuss and plan for your preventive service needs. Take advantage of this benefit every year!  -All adults over the age of 72 should receive the recommended pneumonia vaccines. Current USPSTF guidelines recommend a series of two vaccines for the best pneumonia protection.   -All adults should have a flu vaccine yearly and a tetanus vaccine every 10 years.   -All adults age 48 and older should receive the shingles vaccines (series of two vaccines). -All adults age 38-68 who are overweight should have a diabetes screening test once every three years.   -All adults born between [birth year] and 1966 should be screened once for Hepatitis C.  -Other screening tests and preventive services for persons with diabetes include: an eye exam to screen for diabetic retinopathy, a kidney function test, a foot exam, and stricter control over your cholesterol. -Cardiovascular screening for adults with routine risk involves an electrocardiogram (ECG) at intervals determined by your doctor.   -Colorectal cancer screenings should be done for adults age 54-65 with no increased risk factors for colorectal cancer. There are a number of acceptable methods of screening for this type of cancer. Each test has its own benefits and drawbacks. Discuss with your doctor what is most appropriate for you during your annual wellness visit. The different tests include: colonoscopy (considered the best screening method), a fecal occult blood test, a fecal DNA test, and sigmoidoscopy.    -A bone mass density test is recommended when a woman turns 65 to screen for osteoporosis. This test is only recommended one time, as a screening. Some providers will use this same test as a disease monitoring tool if you already have osteoporosis. -Breast cancer screenings are recommended every other year for women of normal risk, age 54-69.  -Cervical cancer screenings for women over age 72 are only recommended with certain risk factors.      Here is a list of your current Health Maintenance items (your personalized list of preventive services) with a due date:  Health Maintenance Due   Topic Date Due    Eye Exam  04/05/2018    Albumin Urine Test  07/18/2018    Annual Well Visit  07/19/2018    Diabetic Foot Care  02/05/2020    Hemoglobin A1C    04/10/2020    Glaucoma Screening   06/07/2020

## 2020-09-25 NOTE — ASSESSMENT & PLAN NOTE
Stable, based on history, physical exam and review of pertinent labs, studies and medications; meds reconciled; continue current treatment plan.    Key Neurological Meds             phenytoin ER (DILANTIN ER) 100 mg ER capsule Take 3 tabs on odd day and 2 tabs on even days        Lab Results   Component Value Date/Time    WBC 6.0 10/10/2019 05:38 PM    HGB 12.4 10/10/2019 05:38 PM    HCT 35.4 10/10/2019 05:38 PM    PLATELET 565 11/27/5144 05:38 PM    Creatinine 0.71 10/10/2019 05:38 PM    BUN 16 10/10/2019 05:38 PM

## 2020-09-25 NOTE — ASSESSMENT & PLAN NOTE
Stable, based on history, physical exam and review of pertinent labs, studies and medications; meds reconciled; continue current treatment plan. Key Antihyperglycemic Medications             metFORMIN (GLUCOPHAGE) 500 mg tablet Take 1 Tab by mouth daily (with breakfast). Other Key Diabetic Medications             phenytoin ER (DILANTIN ER) 100 mg ER capsule Take 3 tabs on odd day and 2 tabs on even days    lisinopril-hydroCHLOROthiazide (PRINZIDE, ZESTORETIC) 10-12.5 mg per tablet take 1 tablet by mouth once daily. PATIENT NEEDS TO BE SEEN.    lovastatin (MEVACOR) 40 mg tablet take 1 tablet by mouth once daily WITH A MEAL. PATIENT NEEDS AN APPT BEFORE REFILLS        Lab Results   Component Value Date/Time    Hemoglobin A1c 5.4 10/10/2019 05:38 PM    Glucose 105 10/10/2019 05:38 PM    Creatinine 0.71 10/10/2019 05:38 PM    Cholesterol, total 182 10/10/2019 05:38 PM    HDL Cholesterol 87 10/10/2019 05:38 PM    LDL, calculated 88 10/10/2019 05:38 PM    Triglyceride 37 10/10/2019 05:38 PM     Diabetic Foot and Eye Exam HM Status   Topic Date Due    Eye Exam  04/05/2018    Diabetic Foot Care  02/05/2020

## 2020-09-25 NOTE — PROGRESS NOTES
This is the Subsequent Medicare Annual Wellness Exam, performed 12 months or more after the Initial AWV or the last Subsequent AWV    I have reviewed the patient's medical history in detail and updated the computerized patient record. History     Patient Active Problem List   Diagnosis Code    Hypercholesterolemia E78.00    Diabetes ([facility] E11.9    Hypertension I10    Seizures ([facility] R56.9    Fall W19. Patricia Plenty    Fracture of rib of left side S22.32XA    Intellectual disability F79    Hemiparesis (HCC) G81.90     Past Medical History:   Diagnosis Date    Colonoscopy refused 1-7-16    Diabetes [facility])     Fall     Fracture of rib of left side 7-23-15    Hemiparesis ([facility] 1950    left    Hypercholesterolemia     Hypertension     Intellectual disability     S/P colonoscopy 4-17-03    n    Seizures ([facility]       Past Surgical History:   Procedure Laterality Date    HX BREAST BIOPSY Left 2018    needle benign     Current Outpatient Medications   Medication Sig Dispense Refill    phenytoin ER (DILANTIN ER) 100 mg ER capsule Take 3 tabs on odd day and 2 tabs on even days 225 Cap 3    metFORMIN (GLUCOPHAGE) 500 mg tablet Take 1 Tab by mouth daily (with breakfast). 90 Tab 3    lisinopril-hydroCHLOROthiazide (PRINZIDE, ZESTORETIC) 10-12.5 mg per tablet take 1 tablet by mouth once daily. PATIENT NEEDS TO BE SEEN. 90 Tab 3    lovastatin (MEVACOR) 40 mg tablet take 1 tablet by mouth once daily WITH A MEAL. PATIENT NEEDS AN APPT BEFORE REFILLS 90 Tab 11     No Known Allergies    Family History   Problem Relation Age of Onset    Diabetes Mother     No Known Problems Father      Social History     Tobacco Use    Smoking status: Never Smoker    Smokeless tobacco: Never Used   Substance Use Topics    Alcohol use: No       Depression Risk Factor Screening:     3 most recent PHQ Screens 9/25/2020   PHQ Not Done -   Little interest or pleasure in doing things Not at all   Feeling down, depressed, irritable, or hopeless Not at all   Total Score PHQ 2 0       Alcohol Risk Screen   Do you average more than 1 drink per night or more than 7 drinks a week:  No    On any one occasion in the past three months have you have had more than 3 drinks containing alcohol:  No        Functional Ability and Level of Safety:   Hearing: Hearing is good. Activities of Daily Living: The home contains: no safety equipment. Patient does total self care     Ambulation: with no difficulty     Fall Risk:  Fall Risk Assessment, last 12 mths 9/25/2020   Able to walk? Yes   Fall in past 12 months? No   Fall with injury? -   Number of falls in past 12 months -   Fall Risk Score -     Abuse Screen:  Patient is not abused       Cognitive Screening   Has your family/caregiver stated any concerns about your memory: no     Cognitive Screening: not necessary    Patient Care Team   Patient Care Team:  Yousuf Marrero MD as PCP - General (Internal Medicine)  Yousuf Marrero MD as PCP - [facility]  Yousuf Marrero MD (Internal Medicine)    Assessment/Plan   Education and counseling provided:  Are appropriate based on today's review and evaluation    Diagnoses and all orders for this visit:    1. Intellectual disability    2. Type 2 diabetes mellitus without complication, without long-term current use of insulin ([facility])  Assessment & Plan:  Stable, based on history, physical exam and review of pertinent labs, studies and medications; meds reconciled; continue current treatment plan. Key Antihyperglycemic Medications             metFORMIN (GLUCOPHAGE) 500 mg tablet Take 1 Tab by mouth daily (with breakfast). Other Key Diabetic Medications             phenytoin ER (DILANTIN ER) 100 mg ER capsule Take 3 tabs on odd day and 2 tabs on even days    lisinopril-hydroCHLOROthiazide (PRINZIDE, ZESTORETIC) 10-12.5 mg per tablet take 1 tablet by mouth once daily.  PATIENT NEEDS TO BE SEEN.    lovastatin (MEVACOR) 40 mg tablet take 1 tablet by mouth once daily WITH A MEAL. PATIENT NEEDS AN APPT BEFORE REFILLS        Lab Results   Component Value Date/Time    Hemoglobin A1c 5.4 10/10/2019 05:38 PM    Glucose 105 10/10/2019 05:38 PM    Creatinine 0.71 10/10/2019 05:38 PM    Cholesterol, total 182 10/10/2019 05:38 PM    HDL Cholesterol 87 10/10/2019 05:38 PM    LDL, calculated 88 10/10/2019 05:38 PM    Triglyceride 37 10/10/2019 05:38 PM     Diabetic Foot and Eye Exam HM Status   Topic Date Due    Eye Exam  04/05/2018    Diabetic Foot Care  02/05/2020       Orders:  -     URINALYSIS W/ RFLX MICROSCOPIC  -     CBC WITH AUTOMATED DIFF  -     METABOLIC PANEL, COMPREHENSIVE  -     LIPID PANEL  -     TSH 3RD GENERATION  -     COLLECTION VENOUS BLOOD,VENIPUNCTURE  -     HEMOGLOBIN A1C WITH EAG  -     BRITTANY MAMMO BI SCREENING INCL CAD; Future    3. Seizures ([facility]  Assessment & Plan:  Stable, based on history, physical exam and review of pertinent labs, studies and medications; meds reconciled; continue current treatment plan. Lab Results   Component Value Date/Time    WBC 6.0 10/10/2019 05:38 PM    HGB 12.4 10/10/2019 05:38 PM    HCT 35.4 10/10/2019 05:38 PM    PLATELET 983 60/82/0564 05:38 PM    Creatinine 0.71 10/10/2019 05:38 PM    BUN 16 10/10/2019 05:38 PM    Potassium 4.0 10/10/2019 05:38 PM       Orders:  -     PHENYTOIN    4. Hemiparesis of left nondominant side, unspecified hemiparesis etiology ([facility]  Assessment & Plan:  Stable, based on history, physical exam and review of pertinent labs, studies and medications; meds reconciled; continue current treatment plan.    Key Neurological Meds             phenytoin ER (DILANTIN ER) 100 mg ER capsule Take 3 tabs on odd day and 2 tabs on even days        Lab Results   Component Value Date/Time    WBC 6.0 10/10/2019 05:38 PM    HGB 12.4 10/10/2019 05:38 PM    HCT 35.4 10/10/2019 05:38 PM    PLATELET 227 96/51/5869 05:38 PM    Creatinine 0.71 10/10/2019 05:38 PM    BUN 16 10/10/2019 05:38 PM 5. Hypercholesterolemia    6. Essential hypertension    7. Encounter for screening mammogram for malignant neoplasm of breast   -     BRITTANY MAMMO BI SCREENING INCL CAD;  Future        Health Maintenance Due   Topic Date Due    Eye Exam Retinal or Dilated  04/05/2018    MICROALBUMIN Q1  07/18/2018    Medicare Yearly Exam  07/19/2018    Foot Exam Q1  02/05/2020    A1C test (Diabetic or Prediabetic)  04/10/2020    GLAUCOMA SCREENING Q2Y  06/07/2020

## 2020-09-25 NOTE — ASSESSMENT & PLAN NOTE
Stable, based on history, physical exam and review of pertinent labs, studies and medications; meds reconciled; continue current treatment plan.   Lab Results   Component Value Date/Time    WBC 6.0 10/10/2019 05:38 PM    HGB 12.4 10/10/2019 05:38 PM    HCT 35.4 10/10/2019 05:38 PM    PLATELET 291 57/95/9134 05:38 PM    Creatinine 0.71 10/10/2019 05:38 PM    BUN 16 10/10/2019 05:38 PM    Potassium 4.0 10/10/2019 05:38 PM

## 2020-09-26 LAB
ALBUMIN SERPL-MCNC: 4.1 G/DL (ref 3.7–4.7)
ALBUMIN/GLOB SERPL: 1.1 {RATIO} (ref 1.2–2.2)
ALP SERPL-CCNC: 74 IU/L (ref 39–117)
ALT SERPL-CCNC: 13 IU/L (ref 0–32)
APPEARANCE UR: CLEAR
AST SERPL-CCNC: 13 IU/L (ref 0–40)
BACTERIA #/AREA URNS HPF: ABNORMAL /[HPF]
BASOPHILS # BLD AUTO: 0 X10E3/UL (ref 0–0.2)
BASOPHILS NFR BLD AUTO: 1 %
BILIRUB SERPL-MCNC: <0.2 MG/DL (ref 0–1.2)
BILIRUB UR QL STRIP: NEGATIVE
BUN SERPL-MCNC: 17 MG/DL (ref 8–27)
BUN/CREAT SERPL: 23 (ref 12–28)
CALCIUM SERPL-MCNC: 9.4 MG/DL (ref 8.7–10.3)
CASTS URNS QL MICRO: ABNORMAL /LPF
CHLORIDE SERPL-SCNC: 96 MMOL/L (ref 96–106)
CHOLEST SERPL-MCNC: 203 MG/DL (ref 100–199)
CO2 SERPL-SCNC: 26 MMOL/L (ref 20–29)
COLOR UR: YELLOW
CREAT SERPL-MCNC: 0.74 MG/DL (ref 0.57–1)
EOSINOPHIL # BLD AUTO: 0.1 X10E3/UL (ref 0–0.4)
EOSINOPHIL NFR BLD AUTO: 3 %
EPI CELLS #/AREA URNS HPF: ABNORMAL /HPF (ref 0–10)
ERYTHROCYTE [DISTWIDTH] IN BLOOD BY AUTOMATED COUNT: 12.1 % (ref 11.7–15.4)
EST. AVERAGE GLUCOSE BLD GHB EST-MCNC: 105 MG/DL
GLOBULIN SER CALC-MCNC: 3.7 G/DL (ref 1.5–4.5)
GLUCOSE SERPL-MCNC: 139 MG/DL (ref 65–99)
GLUCOSE UR QL: ABNORMAL
HBA1C MFR BLD: 5.3 % (ref 4.8–5.6)
HCT VFR BLD AUTO: 36.5 % (ref 34–46.6)
HDLC SERPL-MCNC: 74 MG/DL
HGB BLD-MCNC: 12.7 G/DL (ref 11.1–15.9)
HGB UR QL STRIP: NEGATIVE
IMM GRANULOCYTES # BLD AUTO: 0 X10E3/UL (ref 0–0.1)
IMM GRANULOCYTES NFR BLD AUTO: 0 %
KETONES UR QL STRIP: NEGATIVE
LDLC SERPL CALC-MCNC: 122 MG/DL (ref 0–99)
LEUKOCYTE ESTERASE UR QL STRIP: ABNORMAL
LYMPHOCYTES # BLD AUTO: 1.4 X10E3/UL (ref 0.7–3.1)
LYMPHOCYTES NFR BLD AUTO: 33 %
MCH RBC QN AUTO: 31.3 PG (ref 26.6–33)
MCHC RBC AUTO-ENTMCNC: 34.8 G/DL (ref 31.5–35.7)
MCV RBC AUTO: 90 FL (ref 79–97)
MICRO URNS: ABNORMAL
MONOCYTES # BLD AUTO: 0.4 X10E3/UL (ref 0.1–0.9)
MONOCYTES NFR BLD AUTO: 9 %
NEUTROPHILS # BLD AUTO: 2.3 X10E3/UL (ref 1.4–7)
NEUTROPHILS NFR BLD AUTO: 54 %
NITRITE UR QL STRIP: NEGATIVE
PH UR STRIP: 6 [PH] (ref 5–7.5)
PHENYTOIN SERPL-MCNC: 19.4 UG/ML (ref 10–20)
PLATELET # BLD AUTO: 249 X10E3/UL (ref 150–450)
POTASSIUM SERPL-SCNC: 3.9 MMOL/L (ref 3.5–5.2)
PROT SERPL-MCNC: 7.8 G/DL (ref 6–8.5)
PROT UR QL STRIP: NEGATIVE
RBC # BLD AUTO: 4.06 X10E6/UL (ref 3.77–5.28)
RBC #/AREA URNS HPF: ABNORMAL /HPF (ref 0–2)
SODIUM SERPL-SCNC: 138 MMOL/L (ref 134–144)
SP GR UR: 1.01 (ref 1–1.03)
TRIGL SERPL-MCNC: 40 MG/DL (ref 0–149)
TSH SERPL DL<=0.005 MIU/L-ACNC: 0.47 UIU/ML (ref 0.45–4.5)
UROBILINOGEN UR STRIP-MCNC: 0.2 MG/DL (ref 0.2–1)
VLDLC SERPL CALC-MCNC: 7 MG/DL (ref 5–40)
WBC # BLD AUTO: 4.2 X10E3/UL (ref 3.4–10.8)
WBC #/AREA URNS HPF: ABNORMAL /HPF (ref 0–5)

## 2020-10-08 RX ORDER — METFORMIN HYDROCHLORIDE 500 MG/1
500 TABLET ORAL
Qty: 90 TAB | Refills: 3 | Status: SHIPPED | OUTPATIENT
Start: 2020-10-08 | End: 2021-07-01

## 2020-12-04 RX ORDER — LOVASTATIN 40 MG/1
TABLET ORAL
Qty: 90 TAB | Refills: 11 | Status: SHIPPED | OUTPATIENT
Start: 2020-12-04 | End: 2021-11-29

## 2020-12-30 RX ORDER — PHENYTOIN SODIUM 100 MG/1
CAPSULE, EXTENDED RELEASE ORAL
Qty: 225 CAP | Refills: 3 | Status: SHIPPED | OUTPATIENT
Start: 2020-12-30 | End: 2021-10-06

## 2020-12-30 RX ORDER — LISINOPRIL AND HYDROCHLOROTHIAZIDE 10; 12.5 MG/1; MG/1
TABLET ORAL
Qty: 90 TAB | Refills: 3 | Status: SHIPPED | OUTPATIENT
Start: 2020-12-30 | End: 2021-10-06

## 2021-03-04 ENCOUNTER — OFFICE VISIT (OUTPATIENT)
Dept: INTERNAL MEDICINE CLINIC | Age: 76
End: 2021-03-04
Payer: MEDICARE

## 2021-03-04 VITALS
OXYGEN SATURATION: 97 % | DIASTOLIC BLOOD PRESSURE: 63 MMHG | HEART RATE: 96 BPM | BODY MASS INDEX: 25.07 KG/M2 | SYSTOLIC BLOOD PRESSURE: 137 MMHG | TEMPERATURE: 98.2 F | HEIGHT: 61 IN | RESPIRATION RATE: 20 BRPM

## 2021-03-04 DIAGNOSIS — R56.9 SEIZURES (HCC): Primary | ICD-10-CM

## 2021-03-04 DIAGNOSIS — E78.00 HYPERCHOLESTEROLEMIA: ICD-10-CM
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
## 2021-03-04 DIAGNOSIS — E11.9 TYPE 2 DIABETES MELLITUS WITHOUT COMPLICATION, WITHOUT LONG-TERM CURRENT USE OF INSULIN (HCC): ICD-10-CM

## 2021-03-04 DIAGNOSIS — G81.94 HEMIPARESIS OF LEFT NONDOMINANT SIDE, UNSPECIFIED HEMIPARESIS ETIOLOGY (HCC): ICD-10-CM

## 2021-03-04 DIAGNOSIS — I10 ESSENTIAL HYPERTENSION: ICD-10-CM

## 2021-03-04 PROCEDURE — 1090F PRES/ABSN URINE INCON ASSESS: CPT | Performed by: INTERNAL MEDICINE

## 2021-03-04 PROCEDURE — G8510 SCR DEP NEG, NO PLAN REQD: HCPCS | Performed by: INTERNAL MEDICINE

## 2021-03-04 PROCEDURE — G8428 CUR MEDS NOT DOCUMENT: HCPCS | Performed by: INTERNAL MEDICINE

## 2021-03-04 PROCEDURE — G8754 DIAS BP LESS 90: HCPCS | Performed by: INTERNAL MEDICINE

## 2021-03-04 PROCEDURE — G8399 PT W/DXA RESULTS DOCUMENT: HCPCS | Performed by: INTERNAL MEDICINE

## 2021-03-04 PROCEDURE — 73564 X-RAY EXAM KNEE 4 OR MORE: CPT | Performed by: INTERNAL MEDICINE

## 2021-03-04 PROCEDURE — G8752 SYS BP LESS 140: HCPCS | Performed by: INTERNAL MEDICINE

## 2021-03-04 PROCEDURE — 1101F PT FALLS ASSESS-DOCD LE1/YR: CPT | Performed by: INTERNAL MEDICINE

## 2021-03-04 PROCEDURE — G8419 CALC BMI OUT NRM PARAM NOF/U: HCPCS | Performed by: INTERNAL MEDICINE

## 2021-03-04 PROCEDURE — 99215 OFFICE O/P EST HI 40 MIN: CPT | Performed by: INTERNAL MEDICINE

## 2021-03-04 PROCEDURE — G8536 NO DOC ELDER MAL SCRN: HCPCS | Performed by: INTERNAL MEDICINE

## 2021-03-04 NOTE — PROGRESS NOTES
SPORTS MEDICINE AND PRIMARY CARE  Chang Zamora MD, [address] [address]  Phone:  [phone]  Fax: [phone]       Chief Complaint   Patient presents with   [facility]   . SUBJECTIVE:    Sarah Beth Wilburn is a 67 y.o. female Patient returns today with her sister, who has noted several falls, three in total this week. She seems to feel that her right leg gives way or else she falls backward. There has been no head injury, no loss of consciousness, no new neurologic deficits have been noted by her sister. Patient has a known history of diabetes, seizure disorder, left hemiparesis, dyslipidemia, primary hypertension, and is seen for evaluation. Current Outpatient Medications   Medication Sig Dispense Refill    lisinopril-hydroCHLOROthiazide (PRINZIDE, ZESTORETIC) 10-12.5 mg per tablet take 1 tablet by mouth once daily. PATIENT NEEDS TO BE SEEN. 90 Tab 3    phenytoin ER (DILANTIN ER) 100 mg ER capsule Take 3 tabs on odd day and 2 tabs on even days 225 Cap 3    lovastatin (MEVACOR) 40 mg tablet take 1 tablet by mouth once daily WITH A MEAL. PATIENT NEEDS AN APPT BEFORE REFILLS 90 Tab 11    metFORMIN (GLUCOPHAGE) 500 mg tablet Take 1 Tab by mouth daily (with breakfast).  80 Tab 3     Past Medical History:   Diagnosis Date    Colonoscopy refused 1-7-16    Diabetes [facility])     Fall     Fracture of rib of left side 7-23-15    Hemiparesis ([facility] 1950    left    Hypercholesterolemia     Hypertension     Intellectual disability     S/P colonoscopy 4-17-03    n    Seizures ([facility]      Past Surgical History:   Procedure Laterality Date    HX BREAST BIOPSY Left 2018    needle benign     No Known Allergies      REVIEW OF SYSTEMS:  General: negative for - chills or fever  ENT: negative for - headaches, nasal congestion or tinnitus  Respiratory: negative for - cough, hemoptysis, shortness of breath or wheezing  Cardiovascular : negative for - chest pain, edema, palpitations or shortness of breath  Gastrointestinal: negative for - abdominal pain, blood in stools, heartburn or nausea/vomiting  Genito-Urinary: no dysuria, trouble voiding, or hematuria  Musculoskeletal: negative for - gait disturbance, joint pain, joint stiffness or joint swelling  Neurological: no TIA or stroke symptoms  Hematologic: no bruises, no bleeding, no swollen glands  Integument: no lumps, mole changes, nail changes or rash  Endocrine: no malaise/lethargy or unexpected weight changes      Social History     Socioeconomic History    Marital status: SINGLE     Spouse name: Not on file    Number of children: Not on file    Years of education: Not on file    Highest education level: Not on file   Tobacco Use    Smoking status: Never Smoker    Smokeless tobacco: Never Used   Substance and Sexual Activity    Alcohol use: No    Drug use: No    Sexual activity: Not Currently   Social History Narrative    ** Merged History Encounter **     Medical History: DM2 non-insulin req. 1997primary HTN 1992Dyslipidemia 1992Mental retardation 1950Seizure d/o Left hemiparesis    Obesity     Gyn History: Last mammogram date 2013. Last menstrual perioddate . OB History: Total pregnancies 0. Surgical History: colonoscopy     Hospitalization/Major Diagnostic Procedure: seizure d/o     Family History: Mother: , DM complications, HTN, ASHDFather:  79 yrsChildren: Dimple Jakes brother(s) , 4 sister(s) . Social History: Alcohol Use Patient does not use alcohol. Smoking Status Patient is a never smoker. Marital Status: Single. Lives w ith:    sister(s). Occupation/W ork: disabled. Education/School: high school.  Sabianism: no.     Family History   Problem Relation Age of Onset    Diabetes Mother     No Known Problems Father        OBJECTIVE:    Visit Vitals  /63   Pulse 96   Temp 98.2 °F (36.8 °C) (Oral)   Resp 20   Ht 5' 1\" (1.549 m)   SpO2 97%   BMI 25.07 kg/m² CONSTITUTIONAL: well , well nourished, appears age appropriate  EYES: perrla, eom intact  ENMT:moist mucous membranes, pharynx clear  NECK: supple. Thyroid normal  RESPIRATORY: Chest: clear bilaterally   CARDIOVASCULAR: Heart: regular rate and rhythm  GASTROINTESTINAL: Abdomen: soft, bowel sounds active  HEMATOLOGIC: no pathological lymph nodes palpated  MUSCULOSKELETAL: Extremities: no edema, pulse 1+   INTEGUMENT: No unusual rashes or suspicious skin lesions noted. Nails appear normal.  NEUROLOGIC: non-focal exam   MENTAL STATUS: alert and oriented, appropriate affect           ASSESSMENT:  1. Seizures ([address]    2. Type 2 diabetes mellitus without complication, without long-term current use of insulin ([address]    3. Hemiparesis of left nondominant side, unspecified hemiparesis etiology ([address]    4. Fall, sequela    5. Hypercholesterolemia    6. Essential hypertension      She has had a history of seizure disorder, but no seizures since we last saw her. She is currently on phenytoin. We will assess blood sugar control with hemoglobin A1c, which has been good. She has a left hemiparesis and has precarious gait, but now is having falls, which is very concerning to me. I will ask for a CT of the head. Sister does not think she could tolerate the MRI. CT primarily to exclude any significant new pathology. For the falls we will ask for a rollabout walker and have her see physical therapy to see if we can help her with her gait to stop the frequent falls. BP control is at goal.    She will be back to see me in six weeks, sooner if needed. We will check a Dilantin level to be sure Dilantin toxicity is not the cause of her falls. I have discussed the diagnosis with the patient and the intended plan as seen in the  Orders. The patient understands and agees with the plan.   The patient has   received an after visit summary and questions were answered concerning  future plans  Patient labs and/or xrays were reviewed  Past records were reviewed. PLAN:  .  Orders Placed This Encounter    AMB SUPPLY ORDER    AMB SUPPLY ORDER    CT HEAD WO CONT    RENAL FUNCTION PANEL    CBC WITH AUTOMATED DIFF    PHENYTOIN    REFERRAL TO PHYSICAL THERAPY       Follow-up and Dispositions    · Return in about 6 weeks (around 4/15/2021). ATTENTION:   This medical record was transcribed using an electronic medical records system. Although proofread, it may and can contain electronic and spelling errors. Other human spelling and other errors may be present. Corrections may be executed at a later time. Please feel free to contact us for any clarifications as needed.

## 2021-03-04 NOTE — PROGRESS NOTES
1. Have you been to the ER, urgent care clinic since your last visit? Hospitalized since your last visit? No    2. Have you seen or consulted any other health care providers outside of the [address] [place] [address] since your last visit? Include any pap smears or colon screening.  No     Wants to discuss falls

## 2021-03-05 ENCOUNTER — HOSPITAL ENCOUNTER (OUTPATIENT)
Age: 76
Setting detail: OBSERVATION
Discharge: HOME | End: 2021-03-05
Attending: EMERGENCY MEDICINE | Admitting: HOSPITALIST
Payer: MEDICARE

## 2021-03-05 ENCOUNTER — APPOINTMENT (OUTPATIENT)
Dept: GENERAL RADIOLOGY | Age: 76
End: 2021-03-05
Attending: EMERGENCY MEDICINE
Payer: MEDICARE

## 2021-03-05 ENCOUNTER — HOSPITAL ENCOUNTER (OUTPATIENT)
Dept: CT IMAGING | Age: 76
Discharge: HOME | End: 2021-03-05
Attending: INTERNAL MEDICINE
Payer: MEDICARE

## 2021-03-05 ENCOUNTER — HOSPITAL ENCOUNTER (OUTPATIENT)
Dept: MAMMOGRAPHY | Age: 76
Discharge: HOME | End: 2021-03-05
Attending: INTERNAL MEDICINE
Payer: MEDICARE

## 2021-03-05 VITALS
SYSTOLIC BLOOD PRESSURE: 155 MMHG | HEART RATE: 95 BPM | TEMPERATURE: 98.2 F | DIASTOLIC BLOOD PRESSURE: 66 MMHG | RESPIRATION RATE: 18 BRPM | OXYGEN SATURATION: 97 %

## 2021-03-05 DIAGNOSIS — S22.32XA CLOSED FRACTURE OF ONE RIB OF LEFT SIDE, INITIAL ENCOUNTER: ICD-10-CM

## 2021-03-05 DIAGNOSIS — Z12.31 ENCOUNTER FOR SCREENING MAMMOGRAM FOR MALIGNANT NEOPLASM OF BREAST: ICD-10-CM
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
## 2021-03-05 DIAGNOSIS — R78.89 ELEVATED DILANTIN LEVEL: ICD-10-CM

## 2021-03-05 DIAGNOSIS — G81.94 HEMIPARESIS OF LEFT NONDOMINANT SIDE, UNSPECIFIED HEMIPARESIS ETIOLOGY (HCC): ICD-10-CM

## 2021-03-05 DIAGNOSIS — E87.1 HYPONATREMIA: Primary | ICD-10-CM

## 2021-03-05 DIAGNOSIS — R56.9 SEIZURES (HCC): ICD-10-CM

## 2021-03-05 DIAGNOSIS — E11.9 TYPE 2 DIABETES MELLITUS WITHOUT COMPLICATION, WITHOUT LONG-TERM CURRENT USE OF INSULIN (HCC): ICD-10-CM

## 2021-03-05 LAB
ALBUMIN SERPL-MCNC: 3.4 G/DL (ref 3.5–5)
ALBUMIN SERPL-MCNC: 3.8 G/DL (ref 3.5–5)
ALBUMIN/GLOB SERPL: 0.7 {RATIO} (ref 1.1–2.2)
ALP SERPL-CCNC: 73 U/L (ref 45–117)
ALT SERPL-CCNC: 22 U/L (ref 12–78)
ANION GAP SERPL CALC-SCNC: 3 MMOL/L (ref 5–15)
ANION GAP SERPL CALC-SCNC: 3 MMOL/L (ref 5–15)
AST SERPL-CCNC: 23 U/L (ref 15–37)
BASOPHILS # BLD: 0 K/UL (ref 0–0.1)
BASOPHILS # BLD: 0 K/UL (ref 0–0.1)
BASOPHILS NFR BLD: 0 % (ref 0–1)
BASOPHILS NFR BLD: 1 % (ref 0–1)
BILIRUB SERPL-MCNC: 0.3 MG/DL (ref 0.2–1)
BUN SERPL-MCNC: 11 MG/DL (ref 6–20)
BUN SERPL-MCNC: 14 MG/DL (ref 6–20)
BUN/CREAT SERPL: 15 (ref 12–20)
BUN/CREAT SERPL: 20 (ref 12–20)
CALCIUM SERPL-MCNC: 8.5 MG/DL (ref 8.5–10.1)
CALCIUM SERPL-MCNC: 8.9 MG/DL (ref 8.5–10.1)
CHLORIDE SERPL-SCNC: 95 MMOL/L (ref 97–108)
CHLORIDE SERPL-SCNC: 96 MMOL/L (ref 97–108)
CO2 SERPL-SCNC: 30 MMOL/L (ref 21–32)
CO2 SERPL-SCNC: 32 MMOL/L (ref 21–32)
COMMENT, HOLDF: NORMAL
CREAT SERPL-MCNC: 0.7 MG/DL (ref 0.55–1.02)
CREAT SERPL-MCNC: 0.73 MG/DL (ref 0.55–1.02)
DIFFERENTIAL METHOD BLD: ABNORMAL
DIFFERENTIAL METHOD BLD: ABNORMAL
EOSINOPHIL # BLD: 0.1 K/UL (ref 0–0.4)
EOSINOPHIL # BLD: 0.2 K/UL (ref 0–0.4)
EOSINOPHIL NFR BLD: 3 % (ref 0–7)
EOSINOPHIL NFR BLD: 3 % (ref 0–7)
ERYTHROCYTE [DISTWIDTH] IN BLOOD BY AUTOMATED COUNT: 12.2 % (ref 11.5–14.5)
ERYTHROCYTE [DISTWIDTH] IN BLOOD BY AUTOMATED COUNT: 12.6 % (ref 11.5–14.5)
GLOBULIN SER CALC-MCNC: 4.6 G/DL (ref 2–4)
GLUCOSE SERPL-MCNC: 75 MG/DL (ref 65–100)
GLUCOSE SERPL-MCNC: 95 MG/DL (ref 65–100)
HCT VFR BLD AUTO: 34.2 % (ref 35–47)
HCT VFR BLD AUTO: 35.8 % (ref 35–47)
HGB BLD-MCNC: 11.5 G/DL (ref 11.5–16)
HGB BLD-MCNC: 11.9 G/DL (ref 11.5–16)
IMM GRANULOCYTES # BLD AUTO: 0 K/UL (ref 0–0.04)
IMM GRANULOCYTES # BLD AUTO: 0 K/UL (ref 0–0.04)
IMM GRANULOCYTES NFR BLD AUTO: 0 % (ref 0–0.5)
IMM GRANULOCYTES NFR BLD AUTO: 0 % (ref 0–0.5)
LYMPHOCYTES # BLD: 1.5 K/UL (ref 0.8–3.5)
LYMPHOCYTES # BLD: 1.6 K/UL (ref 0.8–3.5)
LYMPHOCYTES NFR BLD: 30 % (ref 12–49)
LYMPHOCYTES NFR BLD: 31 % (ref 12–49)
MCH RBC QN AUTO: 30.1 PG (ref 26–34)
MCH RBC QN AUTO: 30.4 PG (ref 26–34)
MCHC RBC AUTO-ENTMCNC: 33.2 G/DL (ref 30–36.5)
MCHC RBC AUTO-ENTMCNC: 33.6 G/DL (ref 30–36.5)
MCV RBC AUTO: 90.4 FL (ref 80–99)
MCV RBC AUTO: 90.5 FL (ref 80–99)
MONOCYTES # BLD: 0.5 K/UL (ref 0–1)
MONOCYTES # BLD: 0.6 K/UL (ref 0–1)
MONOCYTES NFR BLD: 10 % (ref 5–13)
MONOCYTES NFR BLD: 13 % (ref 5–13)
NEUTS SEG # BLD: 2.6 K/UL (ref 1.8–8)
NEUTS SEG # BLD: 2.9 K/UL (ref 1.8–8)
NEUTS SEG NFR BLD: 52 % (ref 32–75)
NEUTS SEG NFR BLD: 57 % (ref 32–75)
NRBC # BLD: 0 K/UL (ref 0–0.01)
NRBC # BLD: 0 K/UL (ref 0–0.01)
NRBC BLD-RTO: 0 PER 100 WBC
NRBC BLD-RTO: 0 PER 100 WBC
PHENYTOIN SERPL-MCNC: 23.6 UG/ML (ref 10–20)
PHOSPHATE SERPL-MCNC: 3.4 MG/DL (ref 2.6–4.7)
PLATELET # BLD AUTO: 217 K/UL (ref 150–400)
PLATELET # BLD AUTO: 242 K/UL (ref 150–400)
PMV BLD AUTO: 8.7 FL (ref 8.9–12.9)
PMV BLD AUTO: 8.7 FL (ref 8.9–12.9)
POTASSIUM SERPL-SCNC: 4.5 MMOL/L (ref 3.5–5.1)
POTASSIUM SERPL-SCNC: 5.2 MMOL/L (ref 3.5–5.1)
PROT SERPL-MCNC: 8 G/DL (ref 6.4–8.2)
RBC # BLD AUTO: 3.78 M/UL (ref 3.8–5.2)
RBC # BLD AUTO: 3.96 M/UL (ref 3.8–5.2)
SAMPLES BEING HELD,HOLD: NORMAL
SODIUM SERPL-SCNC: 129 MMOL/L (ref 136–145)
SODIUM SERPL-SCNC: 130 MMOL/L (ref 136–145)
WBC # BLD AUTO: 4.9 K/UL (ref 3.6–11)
WBC # BLD AUTO: 5.1 K/UL (ref 3.6–11)

## 2021-03-05 PROCEDURE — 96372 THER/PROPH/DIAG INJ SC/IM: CPT

## 2021-03-05 PROCEDURE — 77067 SCR MAMMO BI INCL CAD: CPT

## 2021-03-05 PROCEDURE — 85025 COMPLETE CBC W/AUTO DIFF WBC: CPT

## 2021-03-05 PROCEDURE — 36415 COLL VENOUS BLD VENIPUNCTURE: CPT

## 2021-03-05 PROCEDURE — 99218 HC RM OBSERVATION: CPT

## 2021-03-05 PROCEDURE — 71101 X-RAY EXAM UNILAT RIBS/CHEST: CPT

## 2021-03-05 PROCEDURE — 80053 COMPREHEN METABOLIC PANEL: CPT

## 2021-03-05 PROCEDURE — 99285 EMERGENCY DEPT VISIT HI MDM: CPT

## 2021-03-05 PROCEDURE — 74011250636 HC RX REV CODE- 250/636: Performed by: EMERGENCY MEDICINE

## 2021-03-05 PROCEDURE — 70450 CT HEAD/BRAIN W/O DYE: CPT

## 2021-03-05 RX ORDER — INSULIN LISPRO 100 [IU]/ML
INJECTION, SOLUTION INTRAVENOUS; SUBCUTANEOUS
Status: DISCONTINUED | OUTPATIENT
Start: 2021-03-05 | End: 2021-03-05 | Stop reason: HOSPADM

## 2021-03-05 RX ORDER — ONDANSETRON 2 MG/ML
4 INJECTION INTRAMUSCULAR; INTRAVENOUS
Status: CANCELLED | OUTPATIENT
Start: 2021-03-05

## 2021-03-05 RX ORDER — MAGNESIUM SULFATE 100 %
4 CRYSTALS MISCELLANEOUS AS NEEDED
Status: DISCONTINUED | OUTPATIENT
Start: 2021-03-05 | End: 2021-03-05 | Stop reason: HOSPADM

## 2021-03-05 RX ORDER — KETOROLAC TROMETHAMINE 30 MG/ML
15 INJECTION, SOLUTION INTRAMUSCULAR; INTRAVENOUS
Status: COMPLETED | OUTPATIENT
Start: 2021-03-05 | End: 2021-03-05

## 2021-03-05 RX ORDER — POLYETHYLENE GLYCOL 3350 17 G/17G
17 POWDER, FOR SOLUTION ORAL DAILY PRN
Status: CANCELLED | OUTPATIENT
Start: 2021-03-05

## 2021-03-05 RX ORDER — HYDROCODONE BITARTRATE AND ACETAMINOPHEN 5; 325 MG/1; MG/1
1 TABLET ORAL
Status: DISCONTINUED | OUTPATIENT
Start: 2021-03-05 | End: 2021-03-05 | Stop reason: HOSPADM

## 2021-03-05 RX ORDER — SODIUM CHLORIDE 0.9 % (FLUSH) 0.9 %
5-40 SYRINGE (ML) INJECTION AS NEEDED
Status: CANCELLED | OUTPATIENT
Start: 2021-03-05

## 2021-03-05 RX ORDER — HYDROCODONE BITARTRATE AND ACETAMINOPHEN 5; 325 MG/1; MG/1
1 TABLET ORAL
Qty: 5 TAB | Refills: 0 | Status: SHIPPED | OUTPATIENT
Start: 2021-03-05 | End: 2021-03-08

## 2021-03-05 RX ORDER — ATORVASTATIN CALCIUM 20 MG/1
10 TABLET, FILM COATED ORAL
Status: CANCELLED | OUTPATIENT
Start: 2021-03-05

## 2021-03-05 RX ORDER — PROMETHAZINE HYDROCHLORIDE 25 MG/1
12.5 TABLET ORAL
Status: CANCELLED | OUTPATIENT
Start: 2021-03-05

## 2021-03-05 RX ORDER — ACETAMINOPHEN 650 MG/1
650 SUPPOSITORY RECTAL
Status: CANCELLED | OUTPATIENT
Start: 2021-03-05

## 2021-03-05 RX ORDER — KETOROLAC TROMETHAMINE 30 MG/ML
15 INJECTION, SOLUTION INTRAMUSCULAR; INTRAVENOUS ONCE
Status: DISCONTINUED | OUTPATIENT
Start: 2021-03-05 | End: 2021-03-05 | Stop reason: DRUGHIGH

## 2021-03-05 RX ORDER — SODIUM CHLORIDE 0.9 % (FLUSH) 0.9 %
5-40 SYRINGE (ML) INJECTION EVERY 8 HOURS
Status: CANCELLED | OUTPATIENT
Start: 2021-03-05

## 2021-03-05 RX ORDER — DEXTROSE 50 % IN WATER (D50W) INTRAVENOUS SYRINGE
12.5-25 AS NEEDED
Status: DISCONTINUED | OUTPATIENT
Start: 2021-03-05 | End: 2021-03-05 | Stop reason: HOSPADM

## 2021-03-05 RX ORDER — ACETAMINOPHEN 325 MG/1
650 TABLET ORAL
Status: CANCELLED | OUTPATIENT
Start: 2021-03-05

## 2021-03-05 RX ORDER — SODIUM CHLORIDE 9 MG/ML
75 INJECTION, SOLUTION INTRAVENOUS CONTINUOUS
Status: CANCELLED | OUTPATIENT
Start: 2021-03-05

## 2021-03-05 RX ORDER — ENOXAPARIN SODIUM 100 MG/ML
40 INJECTION SUBCUTANEOUS DAILY
Status: CANCELLED | OUTPATIENT
Start: 2021-03-06

## 2021-03-05 RX ADMIN — KETOROLAC TROMETHAMINE 15 MG: 30 INJECTION, SOLUTION INTRAMUSCULAR at 13:10

## 2021-03-05 NOTE — Clinical Note
Patient Class[de-identified] OBSERVATION [921]   Type of Bed: Neuro/Stroke [9]   Reason for Observation: falls   Admitting Diagnosis: Fall [[id number]]   Admitting Physician: Deborah Pruitt [[id number]]   Attending Physician: Cornelius Lam

## 2021-03-05 NOTE — ED PROVIDER NOTES
42-year-old female with past medical history significant for diabetes, hypertension, high cholesterol, seizure disorder presents with daughter with reports of 3 falls in the last 1 week. Patient complains of left side chest pain secondary to fall. Patient was seen by her primary care physician yesterday secondary to frequent falls and had a head CT ordered. Patient completed the CT in outpatient radiology and came to the emerge department for evaluation of her left-sided chest pain. Denies hip pain contrary to triage note. Denies knee pain. No other complaints.   Denies any recent illness, fever, chills, nausea, vomiting           Past Medical History:   Diagnosis Date    Colonoscopy refused 1-7-16    Diabetes [facility])     Fall     Fracture of rib of left side 7-23-15    Hemiparesis ([facility] 1950    left    Hypercholesterolemia     Hypertension     Intellectual disability     S/P colonoscopy 4-17-03    n    Seizures ([facility]        Past Surgical History:   Procedure Laterality Date    HX BREAST BIOPSY Left 2018    needle benign         Family History:   Problem Relation Age of Onset    Diabetes Mother     No Known Problems Father        Social History     Socioeconomic History    Marital status: SINGLE     Spouse name: Not on file    Number of children: Not on file    Years of education: Not on file    Highest education level: Not on file   Occupational History    Not on file   Social Needs    Financial resource strain: Not on file    Food insecurity     Worry: Not on file     Inability: Not on file    Transportation needs     Medical: Not on file     Non-medical: Not on file   Tobacco Use    Smoking status: Never Smoker    Smokeless tobacco: Never Used   Substance and Sexual Activity    Alcohol use: No    Drug use: No    Sexual activity: Not Currently   Lifestyle    Physical activity     Days per week: Not on file     Minutes per session: Not on file    Stress: Not on file   Relationships  Social connections     Talks on phone: Not on file     Gets together: Not on file     Attends Anabaptist service: Not on file     Active member of club or organization: Not on file     Attends meetings of clubs or organizations: Not on file     Relationship status: Not on file    Intimate partner violence     Fear of current or ex partner: Not on file     Emotionally abused: Not on file     Physically abused: Not on file     Forced sexual activity: Not on file   Other Topics Concern    Not on file   Social History Narrative    ** Merged History Encounter **     Medical History: DM2 non-insulin req. 1997primary HTN 1992Dyslipidemia 1992Mental retardation 1950Seizure d/o 1980Left hemiparesis    Obesity     Gyn History: Last mammogram date 2013. Last menstrual perioddate . OB History: Total pregnancies 0. Surgical History: colonoscopy     Hospitalization/Major Diagnostic Procedure: seizure d/o     Family History: Mother: , DM complications, HTN, ASHDFather:  79 yrsChildren: Rubi Weiss brother(s) , 4 sister(s) . Social History: Alcohol Use Patient does not use alcohol. Smoking Status Patient is a never smoker. Marital Status: Single. Lives w ith:    sister(s). Occupation/W ork: disabled. Education/School: high school. Judaism: no. ALLERGIES: Patient has no known allergies. Review of Systems   Constitutional: Negative for chills and fever. HENT: Negative for congestion, nosebleeds and rhinorrhea. Eyes: Negative for pain and redness. Respiratory: Negative for cough and shortness of breath. Cardiovascular: Positive for chest pain. Negative for palpitations. Gastrointestinal: Negative for abdominal pain, nausea and vomiting. Genitourinary: Negative for dysuria, frequency, vaginal bleeding and vaginal pain. Musculoskeletal: Negative for myalgias. Skin: Negative for rash and wound. Neurological: Positive for weakness.  Negative for seizures and syncope. Hematological: Does not bruise/bleed easily. Psychiatric/Behavioral: Negative for agitation, confusion, dysphoric mood and suicidal ideas. The patient is not nervous/anxious. Vitals:    03/05/21 1102 03/05/21 1157   BP: (!) 177/76    Pulse: 96    Resp: 16    Temp: 97.6 °F (36.4 °C)    SpO2: 100% 99%            Physical Exam  Vitals signs and nursing note reviewed. Constitutional:       Appearance: She is well-developed. HENT:      Head: Normocephalic and atraumatic. Eyes:      Pupils: Pupils are equal, round, and reactive to light. Neck:      Musculoskeletal: Normal range of motion and neck supple. Trachea: No tracheal deviation. Cardiovascular:      Rate and Rhythm: Normal rate and regular rhythm. Heart sounds: Normal heart sounds. Pulmonary:      Effort: Pulmonary effort is normal. No respiratory distress. Breath sounds: Normal breath sounds. No stridor. No wheezing or rales. Chest:      Chest wall: Tenderness present. No mass, deformity or crepitus. Abdominal:      General: Bowel sounds are normal. There is no distension. Palpations: Abdomen is soft. Tenderness: There is no abdominal tenderness. There is no rebound. Musculoskeletal: Normal range of motion. General: No tenderness. Skin:     General: Skin is warm and dry. Coloration: Skin is not pale. Findings: No rash. Neurological:      Mental Status: She is alert and oriented to person, place, and time. Cranial Nerves: No cranial nerve deficit. MDM  Number of Diagnoses or Management Options  Elevated Dilantin level  Hyponatremia  Diagnosis management comments: 69-year-old female with history of diabetes, hypertension, high cholesterol, seizure disorder presents with daughter with reports of three falls in the last week secondary to generalized weakness, complaining of left-sided chest wall pain. Patient denies hip pain.   Patient is afebrile, nontoxic, hemodynamically stable, no respiratory distress, clear to auscultation bilaterally, normal room air oxygen saturation, left chest wall with tenderness to palpation without step-offs or crepitus. Plan-rib x-ray, pain control, CBC/CMP. Labs remarkable for sodium one twenty-nine         Procedures      2:33 PM  Ev Vigil MD spoke with Dr. Jeff Werner, Consult for PMD. Discussed available diagnostic tests and clinical findings. He/She is in agreement with care plans as outlined. He/she advises hospital admission for hyponatremia and elevated dilantin with frequent falls. Perfect Serve Consult for Admission  2:34 PM    ED Room Number: CY50/28  Patient Name and age: Sarah Beth Wilburn 67 y.o.  female  Working Diagnosis:   1. Hyponatremia    2.  Elevated Dilantin level        COVID-19 Suspicion:  no  Sepsis present:  no  Reassessment needed: no  Code Status:  Full Code  Readmission: no  Isolation Requirements:  no  Recommended Level of Care:  telemetry  Department:[facility] Adult ED - 21

## 2021-03-05 NOTE — H&P
[address]   HISTORY AND PHYSICAL    Name:  Alexi Garcia  MR#:  [id number]  :  1946  ACCOUNT #:  [de-identified]  ADMIT DATE:  2021      CONSULT DONE BY:  Julio Camacho MD    CHIEF COMPLAINT:  Multiple falls today. 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 She also has a past medical history of hypertension, seizure disorder, hypercholesterolemia, diabetes mellitus type 2 on metformin, she comes over here because since last 5 days, the patient has had 3 falls, Monday, Tuesday, and Wednesday. Yesterday the patient had gone to see her primary care physician where the primary care physician recommended to get some blood work done for probable Dilantin toxicity. The patient came into ER, found that her Dilantin level was slightly elevated and that is why the patient was given to the hospitalist team for evaluation and further management. On physically asking the patient's sister who is sitting beside the patient and who is answering most of the questions, she states that at baseline, the patient \"drags\" her left leg when walking, but since last few weeks, the patient's right leg has been \"giving up\" and that is why she has been falling frequently. She has had orthopedic evaluation in the past.  As far as the sister knows, the patient does not complain of any chest pain, shortness of breath, headache, dizziness, nausea, vomiting, fever, cough before any falls. REVIEW OF SYSTEMS:  Pertinent positive as above. Rest of review of systems were done. They were all negative except for above. PAST MEDICAL HISTORY:  1.  Diabetes mellitus type 2.  2.  Hypertension. 3.  Hypercholesterolemia. 4.  Seizure disorder. PAST SURGICAL HISTORY:  Breast biopsy. FAMILY HISTORY:  Significant for diabetes. SOCIAL HISTORY:  No smoking, nonalcoholic, non-drug abuser. Currently lives with her sister.     HOME MEDICATIONS:  The patient is on the following medications,  1. Lisinopril/hydrochlorothiazide 10/12.5 mg p.o. daily. 2.  Dilantin  mg capsule 3 tablets odd days and two tablets even days. 3.  Metformin 500 mg p.o. daily. 4.  Lovastatin 40 mg p.o. at bedtime. PHYSICAL EXAMINATION:  VITAL SIGNS:  At the time the patient was seen, the patient's vitals were as follows, blood pressure 149/65, pulse 88, afebrile, respiratory rate 18, saturating 97% on room air. GENERAL:  Not in acute distress, comfortably lying in bed. HEENT:  Head:  Normocephalic, atraumatic. Eyes:  PERRLA. EOMI. Ears:  Bilateral hearing normal.  No growth. Nose:  No polyp or bleeding. Mouth:  Moist mucous membranes. Decent oral hygiene. NECK:  Supple. No JVD. No thyromegaly. RESPIRATORY:  Clear to auscultation bilaterally. No adventitious breath sounds. CARDIOVASCULAR:  S1, S2 normal.  No murmurs, rubs, or gallops. GI:  Bowel sounds present. Soft, nontender, nondistended. NEUROLOGICAL:  At baseline, alert, awake, oriented. LABORATORY AND RADIOLOGICAL RESULTS:  WBC 4.9, hemoglobin 11.9, hematocrit 35, and a platelet count of 014. Sodium 129, potassium 5.2, chloride 96, bicarbonate 30, BUN 14, and creatinine 0.7. The patient's Dilantin level is 23.6. IMAGING:  X-ray of the rib shows probable acute nondisplaced fracture of the lateral left ninth rib. CT scan of the head shows moderate to severe chronic microvascular ischemic changes. ASSESSMENT AND PLAN:  This is a 58-year-old [facility] [employer] female with a past medical history of hypertension, diabetes mellitus type 2 on metformin, congenital shortness of left upper and lower extremities. She comes over here because of frequent falls and is found to have elevated Dilantin. 1.  Falls, likely musculoskeletal.  I do not think that the patient needs an extensive evaluation. CT scan of the head seems unremarkable.   The patient needs physical therapy which her primary care physician has already setup for the next week. Please note that I had an extensive discussion with Dr. Reva Denver who agrees with the plan of letting the patient go home. The patient's sister also insists on letting the patient go home. Also please note that her fantastic primary care physician has also arranged for wheelchair for the patient. 2.  Slightly elevated Dilantin. I do not think that this is contributing to patient's fall. Anyways, I have recommended to the patient does not take her Dilantin for the next 2 days and restart taking them from Monday. 3.  Hypertension:  Restart home medication. 4.  Hypercholesterolemia:  Stable. 5.  The patient is full code. I spent 45 minutes in taking care of the patient.         Marcial Laura MD      [id number]/  D:  03/05/2021 15:46  T:  03/05/2021 17:06  JOB #:  [id number]

## 2021-03-05 NOTE — DISCHARGE INSTRUCTIONS
Discharge Instructions       PATIENT ID: Lisandra Elam  MRN: [id number]   YOB: 1946    DATE OF ADMISSION: 3/5/2021 11:41 AM    DATE OF DISCHARGE: 3/5/2021    PRIMARY CARE PROVIDER: Dorothea Tran MD     ATTENDING PHYSICIAN: Mono Bourgeois MD  DISCHARGING PROVIDER: Sharmila Butt MD    To contact this individual call [phone] and ask the  to page. If unavailable ask to be transferred the Adult Hospitalist Department. DISCHARGE DIAGNOSES   Fall  Hyponatremia    CONSULTATIONS: IP CONSULT TO PRIMARY CARE PROVIDER  IP CONSULT TO PRIMARY CARE PROVIDER  IP CONSULT TO NEUROLOGY    PROCEDURES/SURGERIES: * No surgery found *    PENDING TEST RESULTS:   At the time of discharge the following test results are still pending: none    FOLLOW UP APPOINTMENTS:   Follow-up Information     Follow up With Specialties Details Why Contact Info    Dorothea Tran MD Internal Medicine In 3 days  [facility]  [address]  [facility] 7 [phone]             ADDITIONAL CARE RECOMMENDATIONS:   Follow up with PMD  Please hold dilantin and restart taking from Monday 3/8    DIET: Diabetic Diet    ACTIVITY: {discharge activity:43376}      DISCHARGE MEDICATIONS:   See Medication Reconciliation Form    · It is important that you take the medication exactly as they are prescribed. · Keep your medication in the bottles provided by the pharmacist and keep a list of the medication names, dosages, and times to be taken in your wallet. · Do not take other medications without consulting your doctor. NOTIFY YOUR PHYSICIAN FOR ANY OF THE FOLLOWING:   Fever over 101 degrees for 24 hours. Chest pain, shortness of breath, fever, chills, nausea, vomiting, diarrhea, change in mentation, falling, weakness, bleeding. Severe pain or pain not relieved by medications. Or, any other signs or symptoms that you may have questions about.       DISPOSITION:  x  Home With:   OT  PT  Merged with [facility]  RN SNF/Inpatient Rehab/LTAC    Independent/assisted living    Hospice    Other:     CDMP Checked:   Yes x     PROBLEM LIST Updated:  Yes x       Signed:   Boubcaar Chance MD  3/5/2021  3:44 PM

## 2021-03-05 NOTE — ED NOTES
Patient arrives from triage in a wheelchair with a complaint of left sided pain. It is reported that the patient has fell three times this week.  Patient hooked up to the monitor x 2

## 2021-03-05 NOTE — DISCHARGE SUMMARY
Discharge Summary       PATIENT ID: Rosmery Cm  MRN: [id number]   YOB: 1946    DATE OF ADMISSION: 3/5/2021 11:41 AM    DATE OF DISCHARGE: 3/5/2021   PRIMARY CARE PROVIDER: Soraya Valera MD     ATTENDING PHYSICIAN: Dr Jonathan Watson  DISCHARGING PROVIDER: Jonathan Watson MD    To contact this individual call [phone] and ask the  to page. If unavailable ask to be transferred the Adult Hospitalist Department. CONSULTATIONS: IP CONSULT TO PRIMARY CARE PROVIDER  IP CONSULT TO PRIMARY CARE PROVIDER  IP CONSULT TO NEUROLOGY    PROCEDURES/SURGERIES: * No surgery found *    ADMITTING [facility] COURSE:   Fall  -likely mechanical  -outpatient PT/OT    Elevated dilantin  -hold dilantin for 2 days. Sister understands    HTN  -continue home meds    DM type 2  Continue Metformin        DISCHARGE DIAGNOSES / PLAN:      1. Fall     ADDITIONAL CARE RECOMMENDATIONS:   Follow upw ith PMD  Hold dilantin for 2 days     PENDING TEST RESULTS:   At the time of discharge the following test results are still pending: none    FOLLOW UP APPOINTMENTS:    Follow-up Information     Follow up With Specialties Details Why Contact Info    Soraya Valera MD Internal Medicine In 3 days  [address] [phone]               DIET: Diabetic Diet    ACTIVITY: Activity as tolerated      DISCHARGE MEDICATIONS:  Current Discharge Medication List      CONTINUE these medications which have NOT CHANGED    Details   lisinopril-hydroCHLOROthiazide (PRINZIDE, ZESTORETIC) 10-12.5 mg per tablet take 1 tablet by mouth once daily. PATIENT NEEDS TO BE SEEN. Qty: 90 Tab, Refills: 3      phenytoin ER (DILANTIN ER) 100 mg ER capsule Take 3 tabs on odd day and 2 tabs on even days  Qty: 225 Cap, Refills: 3      lovastatin (MEVACOR) 40 mg tablet take 1 tablet by mouth once daily WITH A MEAL. PATIENT NEEDS AN APPT BEFORE REFILLS  Qty: 90 Tab, Refills: 11      metFORMIN (GLUCOPHAGE) 500 mg tablet Take 1 Tab by mouth daily (with breakfast). Qty: 90 Tab, Refills: 3               NOTIFY YOUR PHYSICIAN FOR ANY OF THE FOLLOWING:   Fever over 101 degrees for 24 hours. Chest pain, shortness of breath, fever, chills, nausea, vomiting, diarrhea, change in mentation, falling, weakness, bleeding. Severe pain or pain not relieved by medications. Or, any other signs or symptoms that you may have questions about. DISPOSITION:   x Home With:   OT  PT  HH  RN       Long term SNF/Inpatient Rehab    Independent/assisted living    Hospice    Other:       PATIENT CONDITION AT DISCHARGE:     Functional status    Poor     Deconditioned    x Independent      Cognition    x Lucid     Forgetful     Dementia      Catheters/lines (plus indication)    Smiley     PICC     PEG    x None      Code status   x  Full code     DNR      PHYSICAL EXAMINATION AT DISCHARGE:  Please see progress note      CHRONIC MEDICAL DIAGNOSES:  Problem List as of 3/5/2021 Date Reviewed: 12/7/2017          Codes Class Noted - Resolved    Intellectual disability ICD-10-CM: F79  ICD-9-CM: 039  Unknown - Present        Fall ICD-10-CM: W19. Maluurnell Craft  ICD-9-CM: E888.9  Unknown - Present        Fracture of rib of left side ICD-10-CM: S22.32XA  ICD-9-CM: 807.00  7/23/2015 - Present        Hypercholesterolemia ICD-10-CM: E78.00  ICD-9-CM: 272.0  Unknown - Present        Diabetes ([facility] [address] ICD-10-CM: E11.9  ICD-9-CM: 250.00  Unknown - Present        Hypertension ICD-10-CM: I10  ICD-9-CM: 401.9  Unknown - Present        Seizures ([facility] [address] ICD-10-CM: R56.9  ICD-9-CM: 780.39  Unknown - Present        Hemiparesis ([facility] [address] ICD-10-CM: G81.90  ICD-9-CM: 342.90  1/1/1950 - Present    Overview Signed 12/7/2017  4:48 PM by Wen Quintana MD     left                   Greater than 36 minutes were spent with the patient on counseling and coordination of care    Signed:   Bear Lilly MD  3/5/2021  3:45 PM   .

## 2021-03-05 NOTE — ED TRIAGE NOTES
Triage: Pt arrives from home with CC of left hip pain. Per her daughter the patient has fallen 3 times this week. She was evaluated by Dr. Francisco Otto but at the time she did not report left hip pain which she is reporting now. Dr. Francisco Otto has ordered the patient a wheelchair which they have yet to receive.

## 2021-03-08 ENCOUNTER — PATIENT OUTREACH (OUTPATIENT)
Dept: CASE MANAGEMENT | Age: 76
End: 2021-03-08

## 2021-03-08 NOTE — PROGRESS NOTES
Called pt to follow up on ED visit to [facility] [place] on 3/5/2021. LVM stating my name, CTN with [facility], date and time of call, and return telephone number. Noted that emergency contact has the same telephone number.

## 2021-03-09 ENCOUNTER — PATIENT OUTREACH (OUTPATIENT)
Dept: CASE MANAGEMENT | Age: 76
End: 2021-03-09

## 2021-03-09 NOTE — PROGRESS NOTES
Patient resolved from Transition of Care episode on 3/9/2021. ACM/CTN was unsuccessful at contacting this patient today. Second attempt to reach pt by telephone. Noted that emergency contact has the same telephone number. LVM stating my name, CTN with [facility], date and time of call, and return telephone number. Patient has not had any additional ED or hospital visits. No further outreach scheduled with this CTN/ACM. Episode of Care resolved. Patient has this CTN/ACM contact information if future needs arise.

## 2021-03-10 ENCOUNTER — CLINICAL SUPPORT (OUTPATIENT)
Dept: INTERNAL MEDICINE CLINIC | Age: 76
End: 2021-03-10

## 2021-03-10 DIAGNOSIS — R56.9 SEIZURES (HCC): Primary | ICD-10-CM

## 2021-03-11 LAB
COMMENT, HOLDF: NORMAL
PHENYTOIN SERPL-MCNC: 11 UG/ML (ref 10–20)
SAMPLES BEING HELD,HOLD: NORMAL

## 2021-03-15 ENCOUNTER — HOSPITAL ENCOUNTER (OUTPATIENT)
Dept: PHYSICAL THERAPY | Age: 76
Discharge: HOME | End: 2021-03-15
Payer: MEDICAID

## 2021-03-15 PROCEDURE — 97110 THERAPEUTIC EXERCISES: CPT | Performed by: PHYSICAL THERAPIST

## 2021-03-15 PROCEDURE — 97161 PT EVAL LOW COMPLEX 20 MIN: CPT | Performed by: PHYSICAL THERAPIST

## 2021-03-15 NOTE — PROGRESS NOTES
PT INITIAL EVALUATION NOTE - [facility] 2-15    Patient Name: Gemma Encarnacion  WFYE:[id number]  : 1946  [x]  Patient  Verified  Payor: Anh Sousa / Plan: [place] / Product Type: Medicare /    In time: 1235p  Out time: 130p  Total Treatment Time (min): 55  Total Timed Codes (min): 10  1:1 Treatment Time ([address] only): 10   Visit #: 1     Treatment Area: Hemiplegia, unspecified affecting left nondominant side [G81.94]  Unspecified fall, sequela [W19. XXXS]    SUBJECTIVE  Pain Level (0-10 scale): 0  Any medication changes, allergies to medications, adverse drug reactions, diagnosis change, or new procedure performed?: [] No    [x] Yes (see summary sheet for update)  Subjective:    Patient reports with R leg pain and recent falls. She has a rollator that has been ordered by Dr. David Polanco has ordered her a rollator. Most of the falls were falling backward, and didn't fall on the knee. Her sister states her knee is 'giving out'    She had 3 falls in one week, March, , , and . She went to [facility] [place] and had CT scan and x-rays done and has a nondisplaced rib fracture which is not currently painful. She was previously walking around without issue or use of an assistive device. She has L hemiparesis that was congenital. She has been off work for the last year. OBJECTIVE/EXAMINATION  Posture:  Seated in w/c. L knee in notable genu valgum. Other Observations: R wrist contracted in flexion. Wearing knee sleeve on L knee   Gait and Functional Mobility:  Able to perform sit->stand independently. Ambulated around the clinic using a RW. Scissoring gait pattern with excess toe out bilaterally. * She has difficulty maintaining a straight path with ambulation and remains distracted and requires constant cueing to maintain correct direction.      Palpation: No TTP around knee    Knee ROM: WFLs for standing/walking    LOWER QUARTER   MUSCLE STRENGTH  KEY       R  L  0 - No Contraction  L1, L2 Psoas  4  4  1 - Trace   L3 Quads  4  4  2 - Poor   L4 Tib Ant  Unable to follow commands to test    3 - Fair    L5 EHL  --  --  4 - Good   S1 FHL  --  --  5 - Normal   S2 Hams  Unable to follow commands to test          MMT: seated hip abd/add: 4/5 bilaterally    Neurological: Reflexes / Sensations: nt    10 min Therapeutic Exercise:  [x] See flow sheet :   Rationale: increase ROM, increase strength and improve coordination to improve the patients ability to stand, walk without falling          With   [] TE   [] TA   [] Neuro   [] SC   [] other: Patient Education: [x] Review HEP    [] Progressed/Changed HEP based on:   [] positioning   [] body mechanics   [] transfers   [] heat/ice application    [] other:      Other Objective/Functional Measures: FOTO Functional Measure: deferred         Pain Level (0-10 scale) post treatment: 0    ASSESSMENT/Changes in Function:     [x]  See Plan of GABBIE Baires, DPT 3/15/2021

## 2021-03-15 NOTE — PROGRESS NOTES
Physical Therapy at [facility],   a part of [facility] [place]  [address] [address], [address] [place] [address], [address] [place] [address]  Phone: [phone]  Fax: [phone]      Plan of Care/Statement of Necessity for Physical Therapy Services  2-15    Patient name: Bert Ye  : 1946  Provider#: [id number]  Referral source: Ania Ortega, *      Medical/Treatment Diagnosis: Hemiplegia, unspecified affecting left nondominant side [G81.94]  Unspecified fall, sequela [W19. XXXS]     Prior Hospitalization: see medical history     Comorbidities: intellectual disability, L hemiparesis, diabetes, seizures, HTN, L rib fracture   Prior Level of Function: able to walk without assistive device  Medications: Verified on Patient Summary List  Start of Care: 3/15/21      Onset Date: 3/1/21   The Plan of Care and following information is based on the information from the initial evaluation. Assessment/ domingo information: Ms. Kimberlyn Mccarthy reports with chronic R knee pain that has been exacerbated/precipitated by 3 recent falls. No known mechanism of falls, though her sister indicates her knee was buckling. She was able to ambulate around the clinic without physical difficulty today using a rolling walker. She wasn't a safe ambulator only because she was unable to keep a straight path secondary to distraction. She will hopefully do well with a rollator that she should be getting within the next week. Will monitor how she does with the rollator while working on some LE strengthening exercises/balance training to help reduce fall risk.      Evaluation Complexity History HIGH Complexity :3+ comorbidities / personal factors will impact the outcome/ POC ; Examination HIGH Complexity : 4+ Standardized tests and measures addressing body structure, function, activity limitation and / or participation in recreation  ;Presentation LOW Complexity : Stable, uncomplicated    Overall Complexity Rating: LOW Problem List: pain affecting function, decrease strength, impaired gait/ balance, decrease ADL/ functional abilitiies, decrease activity tolerance and decrease flexibility/ joint mobility   Treatment Plan may include any combination of the following: Therapeutic exercise, Therapeutic activities, Neuromuscular re-education, Physical agent/modality, Gait/balance training, Manual therapy, Patient education and Self Care training  Patient / Family readiness to learn indicated by: asking questions and trying to perform skills  Persons(s) to be included in education: patient (P) and family support person (FSP);list sister  Barriers to Learning/Limitations: yes;  cognitive, sensory deficits-vision/hearing/speech and altered mental status (i.e.Sedation, Confusion)  Patient Goal (s): walk better  Patient Self Reported Health Status: good  Rehabilitation Potential: fair    Long Term Goals: To be accomplished in 8-10 treatments:   1. Pt will be safe ambulating with rollator walker to reduce fall risk   2. Pt will report no falls during 4 week period   3. Pt and her caregivers will be able to self-manage care using updated HEP and assistive device  Frequency / Duration: Patient to be seen 2 times per week for 4-5 weeks. Patient/ Caregiver education and instruction: self care and exercises    [x]  Plan of care has been reviewed with PTA      Certification Period: 3/15/21-6/15/21  Lucrecia Cuevas PT, DPT 3/15/2021     ________________________________________________________________________    I certify that the above Therapy Services are being furnished while the patient is under my care. I agree with the treatment plan and certify that this therapy is necessary.     Physician's Signature:____________________  Date:____________Time: _________         Kylah Larose, *

## 2021-03-22 ENCOUNTER — HOSPITAL ENCOUNTER (OUTPATIENT)
Dept: PHYSICAL THERAPY | Age: 76
Discharge: HOME | End: 2021-03-22
Payer: MEDICAID

## 2021-03-22 PROCEDURE — 97110 THERAPEUTIC EXERCISES: CPT

## 2021-03-22 PROCEDURE — 97116 GAIT TRAINING THERAPY: CPT

## 2021-03-22 NOTE — PROGRESS NOTES
PT DAILY TREATMENT NOTE - [facility] 2-15    Patient Name: Shawna Mason  Date:3/22/2021  : 1946  [x]  Patient  Verified  Payor: Joya Babinski / Plan: [facility] CCCP / Product Type: Managed Care Medicaid /    In time:2:35P  Out time:3:15P  Total Treatment Time (min): 40  Total Timed Codes (min): 40  1:1 Treatment Time ( only): 40   Visit #:  2    Treatment Area: Hemiplegia, unspecified affecting left nondominant side [G81.94]  Unspecified fall, sequela [F04. XXXS]    SUBJECTIVE  Pain Level (0-10 scale): 0/10  Any medication changes, allergies to medications, adverse drug reactions, diagnosis change, or new procedure performed?: [x] No    [] Yes (see summary sheet for update)  Subjective functional status/changes:   [] No changes reported  Pt's caregiver reports that she is doing the HEP and reporting less pain. Does have a rollator but did not ashutosh it today,     OBJECTIVE    30 min Therapeutic Exercise:  [x] See flow sheet :   Rationale: increase ROM, increase strength and improve coordination to improve the patients ability to increase function and mobility    10 min Gait Training:  _50__ feet x4 with _RW__ device on level surfaces with _CGA__ level of assist and WC follow   Rationale: increase ROM, increase strength and improve coordination  to improve the patients ability to increase stability during ambulation. With   [] TE   [] TA   [] Neuro   [] SC   [] other: Patient Education: [x] Review HEP    [] Progressed/Changed HEP based on:   [] positioning   [] body mechanics   [] transfers   [] heat/ice application    [] other:      Other Objective/Functional Measures: --     Pain Level (0-10 scale) post treatment: 0/10    ASSESSMENT/Changes in Function:   Added in cone reaches and NBOS 15\" x4. Pt tolerated session well. Does require heavy VC regarding keeping the walker close and looking in the direction she wants to walk in.  Dicussed with pt's caregiver that RW may be more appropriate for pt given the challenge of keeping the walker close and pt's caregiver reports of having difficulty holding on to the handle with pulling the brakes on rollator. Pt's caregiver will bring both next session. Patient will continue to benefit from skilled PT services to modify and progress therapeutic interventions, address functional mobility deficits, address ROM deficits, address strength deficits, analyze and address soft tissue restrictions, analyze and cue movement patterns, analyze and modify body mechanics/ergonomics and assess and modify postural abnormalities to attain remaining goals. []  See Plan of Care  []  See progress note/recertification  []  See Discharge Summary         Progress towards goals / Updated goals:  Long Term Goals: To be accomplished in 8-10 treatments:              1. Pt will be safe ambulating with rollator walker to reduce fall risk              2. Pt will report no falls during 4 week period              3. Pt and her caregivers will be able to self-manage care using updated HEP and assistive device  Frequency / Duration: Patient to be seen 2 times per week for 4-5 weeks.     PLAN  [x]  Upgrade activities as tolerated     [x]  Continue plan of care  []  Update interventions per flow sheet       []  Discharge due to:_  []  Other:_      Verna Nagel, KAZ, OPTA 3/22/2021

## 2021-03-24 ENCOUNTER — HOSPITAL ENCOUNTER (OUTPATIENT)
Dept: PHYSICAL THERAPY | Age: 76
Discharge: HOME | End: 2021-03-24
Payer: MEDICAID

## 2021-03-24 PROCEDURE — 97116 GAIT TRAINING THERAPY: CPT | Performed by: PHYSICAL THERAPIST

## 2021-03-24 PROCEDURE — 97110 THERAPEUTIC EXERCISES: CPT | Performed by: PHYSICAL THERAPIST

## 2021-03-24 NOTE — PROGRESS NOTES
PT DAILY TREATMENT NOTE - [facility] 2-15    Patient Name: Rosmery Samples  Date:3/24/2021  : 1946  [x]  Patient  Verified  Payor: Herb Bartholomew / Plan: [facility] [address] [facility] / Product Type: Managed Care Medicaid /    In time: 320p  Out time: 350p  Total Treatment Time (min): 30  Total Timed Codes (min): 30  1:1 Treatment Time ( only): 25   Visit #:  3    Treatment Area: Hemiplegia, unspecified affecting left nondominant side [G81.94]  Unspecified fall, sequela [M70. XXXS]    SUBJECTIVE  Pain Level (0-10 scale): 0  Any medication changes, allergies to medications, adverse drug reactions, diagnosis change, or new procedure performed?: [x] No    [] Yes (see summary sheet for update)  Subjective functional status/changes:   [] No changes reported  Doing fine today. Knee feels a little weak, but not having any pain. Patient's sister reports she seems to be doing much better overall. OBJECTIVE  20 min Therapeutic Exercise:  [x]? See flow sheet :   Rationale: increase ROM, increase strength and improve coordination to improve the patients ability to increase function and mobility     10 min Gait Training:  _200__ feet x 2 with _RW__ device on level surfaces with _CGA__ level of assist and WC follow   Rationale: increase ROM, increase strength and improve coordination  to improve the patients ability to increase stability during ambulation. With   []? TE   []? TA   []? Neuro   []? SC   []? other: Patient Education: [x]? Review HEP    []? Progressed/Changed HEP based on:   []? positioning   []? body mechanics   []? transfers   []? heat/ice application    []? other:       Other Objective/Functional Measures: --        Pain Level (0-10 scale) post treatment: 0/10     ASSESSMENT/Changes in Function:   Patient isn't very safe with rollator as she moves too quickly. She did much better with RW, though is still not the safest ambulator.  Advised her sister this will likely be the best assistive device for her. Patient will continue to benefit from skilled PT services to modify and progress therapeutic interventions, address functional mobility deficits, address ROM deficits, address strength deficits, analyze and address soft tissue restrictions, analyze and cue movement patterns, analyze and modify body mechanics/ergonomics and assess and modify postural abnormalities to attain remaining goals. []? See Plan of Care  []? See progress note/recertification  []? See Discharge Summary         Progress towards goals / Updated goals:  Long Term Goals: To be accomplished in 8-10 treatments:              1. Pt will be safe ambulating with rollator walker to reduce fall risk              2. Pt will report no falls during 4 week period              3. Pt and her caregivers will be able to self-manage care using updated HEP and assistive device  Frequency / Duration: Patient to be seen 2 times per week for 4-5 weeks.     PLAN  [x]? Upgrade activities as tolerated     [x]? Continue plan of care  []? Update interventions per flow sheet       []? Discharge due to:_  []?   Other:_         Mike Singh, PT, DPT 3/24/2021

## 2021-03-29 ENCOUNTER — HOSPITAL ENCOUNTER (OUTPATIENT)
Dept: PHYSICAL THERAPY | Age: 76
Discharge: HOME | End: 2021-03-29
Payer: MEDICAID

## 2021-03-29 PROCEDURE — 97116 GAIT TRAINING THERAPY: CPT | Performed by: PHYSICAL THERAPIST

## 2021-03-29 PROCEDURE — 97110 THERAPEUTIC EXERCISES: CPT | Performed by: PHYSICAL THERAPIST

## 2021-03-29 NOTE — PROGRESS NOTES
PT DAILY TREATMENT NOTE - [facility] 2-15    Patient Name: Viktoriya Barrera  Date:3/29/2021  : 1946  [x]  Patient  Verified  Payor: Cesar Cast / Plan: [facility] [address] [facility] CCCP / Product Type: Managed Care Medicaid /    In time: 230p  Out time:310p  Total Treatment Time (min): 40  Total Timed Codes (min): 40  1:1 Treatment Time ( only): 40   Visit #:  4    Treatment Area: Hemiplegia, unspecified affecting left nondominant side [G81.94]  Unspecified fall, sequela [S96. XXXS]    SUBJECTIVE  Pain Level (0-10 scale): 0  Any medication changes, allergies to medications, adverse drug reactions, diagnosis change, or new procedure performed?: [x] No    [] Yes (see summary sheet for update)  Subjective functional status/changes:   [] No changes reported  Doing well. No issues since last visit. Her sister states she is doing her exercises and is comfortable using the rolling walker. OBJECTIVE    30 min Therapeutic Exercise:  [x]? ? See flow sheet :   Rationale: increase ROM, increase strength and improve coordination to improve the patients ability to increase function and mobility     10 min Gait Training:  _200__ feet x 2 with _RW__ device on level surfaces with _CGA__ level of assist and WC follow   Rationale: increase ROM, increase strength and improve coordination  to improve the patients ability to increase stability during ambulation.                                                                 With   []?? TE   []?? TA   []? ? Neuro   []?? SC   []?? other: Patient Education: [x]? ? Review HEP    []? ? Progressed/Changed HEP based on:   []?? positioning   []? ? body mechanics   []? ? transfers   []? ? heat/ice application    []? ? other:       Other Objective/Functional Measures: --        Pain Level (0-10 scale) post treatment: 0/10     ASSESSMENT/Changes in Function:      []? ?  See Plan of Care  []? ?  See progress note/recertification  [x]? ?  See Discharge Summary         Progress towards goals / Updated goals:  Long Term Goals: To be accomplished in 8-10 treatments:              1. Pt will be safe ambulating with rollator walker to reduce fall risk MET with standby assist              2. Pt will report no falls during 4 week period MET              3. Pt and her caregivers will be able to self-manage care using updated HEP and assistive device MET    Frequency / Duration: Patient to be seen 2 times per week for 4-5 weeks.     PLAN  []??  Upgrade activities as tolerated     []? ?  Continue plan of care  []? ?  Update interventions per flow sheet       [x]? ?  Discharge due to: progress  []? ?  Other:_        Giancarlo Peterson, PT, DPT 3/29/2021

## 2021-03-29 NOTE — ANCILLARY DISCHARGE INSTRUCTIONS
Physical Therapy at [facility],   a part of [address] [place] [address]  [address] [address], [address] [place] [address]  Phone: [phone] Fax: [phone]      Discharge Summary 2-15      Patient name: Tashi Newton  : 1946  Provider#: [id number]  Referral source: Shelia Gardner, *      Medical/Treatment Diagnosis: Hemiplegia, unspecified affecting left nondominant side [G81.94]  Unspecified fall, sequela [W19. XXXS]     Prior Hospitalization: see medical history     Comorbidities: intellectual disability, L hemiparesis, diabetes, seizures, HTN, L rib fracture   Prior Level of Function: able to walk without assistive device  Medications: Verified on Patient Summary List     Start of Care: 3/15/21                                                                     Onset Date: 3/1/21           Visits from Start of Care: 4     Missed Visits: 0  Reporting Period : 3/15/21 to 3/29/21    Assessment/Summary of care: Ms. Zaida Aden did well over 4 sessions of PT for her R knee pain and recent falls. She is ambulating much more safely with use of rolling walker, though she still requires standby assist to cue her path and speed. She is no longer reporting any knee pain and her sister reports she is compliant with her exercises at home. She no longer requires skilled PT, and will be able to self-manage at home at this time. Long Term Goals: To be accomplished in 8-10 treatments:              1.  Pt will be safe ambulating with rollator walker to reduce fall risk MET with standby assist              2. Pt will report no falls during 4 week period MET              3. Pt and her caregivers will be able to self-manage care using updated HEP and assistive device MET    RECOMMENDATIONS:  [x]Discontinue therapy: [x]Patient has reached or is progressing toward set goals     []Patient is non-compliant or has abdicated     []Due to lack of appreciable progress towards set goals    Patrick Bence, PT, DPT 3/29/2021

## 2021-03-31 ENCOUNTER — APPOINTMENT (OUTPATIENT)
Dept: PHYSICAL THERAPY | Age: 76
End: 2021-03-31
Payer: MEDICAID

## 2021-04-15 ENCOUNTER — OFFICE VISIT (OUTPATIENT)
Dept: INTERNAL MEDICINE CLINIC | Age: 76
End: 2021-04-15
Payer: MEDICARE

## 2021-04-15 VITALS
TEMPERATURE: 98.1 F | SYSTOLIC BLOOD PRESSURE: 132 MMHG | WEIGHT: 131.5 LBS | RESPIRATION RATE: 18 BRPM | DIASTOLIC BLOOD PRESSURE: 54 MMHG | HEART RATE: 68 BPM | OXYGEN SATURATION: 98 % | BODY MASS INDEX: 24.83 KG/M2 | HEIGHT: 61 IN

## 2021-04-15 DIAGNOSIS — G81.94 HEMIPARESIS OF LEFT NONDOMINANT SIDE, UNSPECIFIED HEMIPARESIS ETIOLOGY (HCC): ICD-10-CM
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
## 2021-04-15 DIAGNOSIS — I10 ESSENTIAL HYPERTENSION: ICD-10-CM

## 2021-04-15 DIAGNOSIS — E78.00 HYPERCHOLESTEROLEMIA: ICD-10-CM

## 2021-04-15 DIAGNOSIS — R56.9 SEIZURES (HCC): ICD-10-CM

## 2021-04-15 DIAGNOSIS — E11.9 TYPE 2 DIABETES MELLITUS WITHOUT COMPLICATION, WITHOUT LONG-TERM CURRENT USE OF INSULIN (HCC): ICD-10-CM

## 2021-04-15 PROCEDURE — G8420 CALC BMI NORM PARAMETERS: HCPCS | Performed by: INTERNAL MEDICINE

## 2021-04-15 PROCEDURE — G8753 SYS BP > OR = 140: HCPCS | Performed by: INTERNAL MEDICINE

## 2021-04-15 PROCEDURE — G8432 DEP SCR NOT DOC, RNG: HCPCS | Performed by: INTERNAL MEDICINE

## 2021-04-15 PROCEDURE — G8399 PT W/DXA RESULTS DOCUMENT: HCPCS | Performed by: INTERNAL MEDICINE

## 2021-04-15 PROCEDURE — G8754 DIAS BP LESS 90: HCPCS | Performed by: INTERNAL MEDICINE

## 2021-04-15 PROCEDURE — 1090F PRES/ABSN URINE INCON ASSESS: CPT | Performed by: INTERNAL MEDICINE

## 2021-04-15 PROCEDURE — 99213 OFFICE O/P EST LOW 20 MIN: CPT | Performed by: INTERNAL MEDICINE

## 2021-04-15 PROCEDURE — G8536 NO DOC ELDER MAL SCRN: HCPCS | Performed by: INTERNAL MEDICINE

## 2021-04-15 PROCEDURE — 1101F PT FALLS ASSESS-DOCD LE1/YR: CPT | Performed by: INTERNAL MEDICINE

## 2021-04-15 PROCEDURE — G8427 DOCREV CUR MEDS BY ELIG CLIN: HCPCS | Performed by: INTERNAL MEDICINE

## 2021-04-15 NOTE — PROGRESS NOTES
1. Have you been to the ER, urgent care clinic since your last visit? Hospitalized since your last visit? No    2. Have you seen or consulted any other health care providers outside of the [address] [place] [address] since your last visit? Include any pap smears or colon screening.  No     Wants to discuss dylantin

## 2021-04-15 NOTE — PROGRESS NOTES
SPORTS MEDICINE AND PRIMARY CARE  Sean Florentino MD, [address] [address]  Phone:  [phone]  Fax: [phone]       Chief Complaint   Patient presents with    Hypertension   . SUBJECTIVE:    Shaina Blevins is a 67 y.o. female Patient returns today following a visit to the ER, where she was seen for a fall and subsequently referred to physical therapy with a history of seizure disorder, primary hypertension, diabetes, dyslipidemia, left hemiparesis, and intellectual disability. She was Dilantin toxic, which I think contributed to her gait disturbance, but she is doing much better with physical therapy. She is now on Dilantin 200 mg daily. Current Outpatient Medications   Medication Sig Dispense Refill    lisinopril-hydroCHLOROthiazide (PRINZIDE, ZESTORETIC) 10-12.5 mg per tablet take 1 tablet by mouth once daily. PATIENT NEEDS TO BE SEEN. 90 Tab 3    phenytoin ER (DILANTIN ER) 100 mg ER capsule Take 3 tabs on odd day and 2 tabs on even days 225 Cap 3    lovastatin (MEVACOR) 40 mg tablet take 1 tablet by mouth once daily WITH A MEAL. PATIENT NEEDS AN APPT BEFORE REFILLS 90 Tab 11    metFORMIN (GLUCOPHAGE) 500 mg tablet Take 1 Tab by mouth daily (with breakfast).  80 Tab 3     Past Medical History:   Diagnosis Date    Colonoscopy refused 1-7-16    Diabetes [facility])     Fall     Fracture of rib of left side 7-23-15    Hemiparesis ([facility] [address] 1950    left    Hypercholesterolemia     Hypertension     Intellectual disability     S/P colonoscopy 4-17-03    n    Seizures ([facility] [address]      Past Surgical History:   Procedure Laterality Date    HX BREAST BIOPSY Left 2018    needle benign     No Known Allergies      REVIEW OF SYSTEMS:  General: negative for - chills or fever  ENT: negative for - headaches, nasal congestion or tinnitus  Respiratory: negative for - cough, hemoptysis, shortness of breath or wheezing  Cardiovascular : negative for - chest pain, edema, palpitations or shortness of breath  Gastrointestinal: negative for - abdominal pain, blood in stools, heartburn or nausea/vomiting  Genito-Urinary: no dysuria, trouble voiding, or hematuria  Musculoskeletal: negative for - gait disturbance, joint pain, joint stiffness or joint swelling  Neurological: no TIA or stroke symptoms  Hematologic: no bruises, no bleeding, no swollen glands  Integument: no lumps, mole changes, nail changes or rash  Endocrine: no malaise/lethargy or unexpected weight changes      Social History     Socioeconomic History    Marital status: SINGLE     Spouse name: Not on file    Number of children: Not on file    Years of education: Not on file    Highest education level: Not on file   Tobacco Use    Smoking status: Never Smoker    Smokeless tobacco: Never Used   Substance and Sexual Activity    Alcohol use: No    Drug use: No    Sexual activity: Not Currently   Social History Narrative    ** Merged History Encounter **     Medical History: DM2 non-insulin req. 1997primary HTN 1992Dyslipidemia 1992Mental retardation 1950Seizure d/o Left hemiparesis    Obesity     Gyn History: Last mammogram date 2013. Last menstrual perioddate . OB History: Total pregnancies 0. Surgical History: colonoscopy     Hospitalization/Major Diagnostic Procedure: seizure d/o     Family History: Mother: , DM complications, HTN, ASHDFather:  79 yrsChildren: Robbie Aragon brother(s) , 4 sister(s) . Social History: Alcohol Use Patient does not use alcohol. Smoking Status Patient is a never smoker. Marital Status: Single. Lives w ith:    sister(s). Occupation/W ork: disabled. Education/School: high school.  Quaker: no.     Family History   Problem Relation Age of Onset    Diabetes Mother     No Known Problems Father        OBJECTIVE:    Visit Vitals  BP (!) 151/74   Pulse 68   Temp 98.1 °F (36.7 °C) (Oral)   Resp 18   Ht 5' 1\" (1.549 m)   Wt 131 lb 8 oz (59.6 kg) SpO2 98%   BMI 24.85 kg/m²     CONSTITUTIONAL: well , well nourished, appears age appropriate  EYES: perrla, eom intact  ENMT:moist mucous membranes, pharynx clear  NECK: supple. Thyroid normal  RESPIRATORY: Chest: clear bilaterally   CARDIOVASCULAR: Heart: regular rate and rhythm  GASTROINTESTINAL: Abdomen: soft, bowel sounds active  HEMATOLOGIC: no pathological lymph nodes palpated  MUSCULOSKELETAL: Extremities: no edema, pulse 1+   INTEGUMENT: No unusual rashes or suspicious skin lesions noted. Nails appear normal.  NEUROLOGIC: non-focal exam   MENTAL STATUS: alert and oriented, appropriate affect           ASSESSMENT:  1. Fall, sequela    2. Seizures ([address]    3. Essential hypertension    4. Type 2 diabetes mellitus without complication, without long-term current use of insulin ([address]    5. Hypercholesterolemia    6. Hemiparesis of left nondominant side, unspecified hemiparesis etiology ([address]      No further falls and had a negative evaluation in the emergency room. She has had no seizures and is on Dilantin at the lower limits of therapeutic range. We will confirm that again today. Repeat blood pressure is 132/54. No titration is needed. She is taking Lisinopril/HCTZ 10/12.5 daily. She has diabetes, for which she is taking Metformin once a day. Hemoglobin A1c was acceptable at 5.3 in September. When we check Dilantin level, we will also repeat that. She is on Atorvastatin for cholesterol with LDL of 122 in September. We will check that today and if still elevated we will titrate the Atorvastatin or change it to a more potent drug. No change in neurologic status. Patient is doing well. She will be back to see me in three to four months, sooner if she needs to. I have discussed the diagnosis with the patient and the intended plan as seen in the  Orders. The patient understands and agees with the plan.   The patient has   received an after visit summary and questions were answered concerning  future plans  Patient labs and/or xrays were reviewed  Past records were reviewed. PLAN:  .  Orders Placed This Encounter    PHENYTOIN    HEMOGLOBIN A1C WITH EAG    LIPID PANEL       Follow-up and Dispositions    · Return in about 4 months (around 8/15/2021). ATTENTION:   This medical record was transcribed using an electronic medical records system. Although proofread, it may and can contain electronic and spelling errors. Other human spelling and other errors may be present. Corrections may be executed at a later time. Please feel free to contact us for any clarifications as needed.

## 2021-04-16 LAB
CHOLEST SERPL-MCNC: 184 MG/DL
EST. AVERAGE GLUCOSE BLD GHB EST-MCNC: 105 MG/DL
HBA1C MFR BLD: 5.3 % (ref 4–5.6)
HDLC SERPL-MCNC: 90 MG/DL
HDLC SERPL: 2 {RATIO} (ref 0–5)
LDLC SERPL CALC-MCNC: 86 MG/DL (ref 0–100)
LIPID PROFILE,FLP: NORMAL
TRIGL SERPL-MCNC: 40 MG/DL (ref ?–150)
VLDLC SERPL CALC-MCNC: 8 MG/DL

## 2021-07-01 RX ORDER — METFORMIN HYDROCHLORIDE 500 MG/1
TABLET ORAL
Qty: 90 TABLET | Refills: 3 | Status: SHIPPED | OUTPATIENT
Start: 2021-07-01 | End: 2021-09-08 | Stop reason: SDUPTHER

## 2021-09-08 ENCOUNTER — OFFICE VISIT (OUTPATIENT)
Dept: INTERNAL MEDICINE CLINIC | Age: 76
End: 2021-09-08
Payer: MEDICARE

## 2021-09-08 VITALS
DIASTOLIC BLOOD PRESSURE: 72 MMHG | SYSTOLIC BLOOD PRESSURE: 132 MMHG | RESPIRATION RATE: 18 BRPM | HEART RATE: 83 BPM | OXYGEN SATURATION: 98 % | BODY MASS INDEX: 25.9 KG/M2 | TEMPERATURE: 97.9 F | WEIGHT: 137.2 LBS | HEIGHT: 61 IN
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
## 2021-09-08 DIAGNOSIS — E78.00 HYPERCHOLESTEROLEMIA: ICD-10-CM

## 2021-09-08 DIAGNOSIS — F79 INTELLECTUAL DISABILITY: ICD-10-CM

## 2021-09-08 DIAGNOSIS — I10 ESSENTIAL HYPERTENSION: ICD-10-CM

## 2021-09-08 DIAGNOSIS — G81.94 HEMIPARESIS OF LEFT NONDOMINANT SIDE, UNSPECIFIED HEMIPARESIS ETIOLOGY (HCC): ICD-10-CM

## 2021-09-08 DIAGNOSIS — R56.9 SEIZURES (HCC): ICD-10-CM

## 2021-09-08 DIAGNOSIS — E11.9 TYPE 2 DIABETES MELLITUS WITHOUT COMPLICATION, WITHOUT LONG-TERM CURRENT USE OF INSULIN (HCC): Primary | ICD-10-CM

## 2021-09-08 PROCEDURE — G8427 DOCREV CUR MEDS BY ELIG CLIN: HCPCS | Performed by: INTERNAL MEDICINE

## 2021-09-08 PROCEDURE — G8419 CALC BMI OUT NRM PARAM NOF/U: HCPCS | Performed by: INTERNAL MEDICINE

## 2021-09-08 PROCEDURE — 1090F PRES/ABSN URINE INCON ASSESS: CPT | Performed by: INTERNAL MEDICINE

## 2021-09-08 PROCEDURE — 1101F PT FALLS ASSESS-DOCD LE1/YR: CPT | Performed by: INTERNAL MEDICINE

## 2021-09-08 PROCEDURE — 99214 OFFICE O/P EST MOD 30 MIN: CPT | Performed by: INTERNAL MEDICINE

## 2021-09-08 PROCEDURE — G8510 SCR DEP NEG, NO PLAN REQD: HCPCS | Performed by: INTERNAL MEDICINE

## 2021-09-08 PROCEDURE — G8754 DIAS BP LESS 90: HCPCS | Performed by: INTERNAL MEDICINE

## 2021-09-08 PROCEDURE — G8752 SYS BP LESS 140: HCPCS | Performed by: INTERNAL MEDICINE

## 2021-09-08 PROCEDURE — G8536 NO DOC ELDER MAL SCRN: HCPCS | Performed by: INTERNAL MEDICINE

## 2021-09-08 PROCEDURE — G8399 PT W/DXA RESULTS DOCUMENT: HCPCS | Performed by: INTERNAL MEDICINE

## 2021-09-08 RX ORDER — METFORMIN HYDROCHLORIDE 500 MG/1
TABLET ORAL
Qty: 90 TABLET | Refills: 3 | Status: SHIPPED | OUTPATIENT
Start: 2021-09-08 | End: 2022-05-26

## 2021-09-08 NOTE — PROGRESS NOTES
SPORTS MEDICINE AND PRIMARY CARE  Jayce Esparza MD, [address] [address]  Phone:  [phone]  Fax: [phone]       Chief Complaint   Patient presents with   [employer] Fall     follow up   . SUBJECTIVE:    Les Flood is a 67 y.o. female Patient returns today with a known history of diabetes mellitus type 2, seizure disorder, fall, intellectual disability, left hemiparesis, primary hypertension, dyslipidemia, and is seen for evaluation. Patient returns today with her sister. She has occasional pain in right knee. She has had no falls since we last saw her and has had no seizures. She is doing well.  patient is seen for evaluation. Current Outpatient Medications   Medication Sig Dispense Refill    metFORMIN (GLUCOPHAGE) 500 mg tablet take 1 tablet by mouth once daily WITH BREAKFAST 90 Tablet 3    lisinopril-hydroCHLOROthiazide (PRINZIDE, ZESTORETIC) 10-12.5 mg per tablet take 1 tablet by mouth once daily. PATIENT NEEDS TO BE SEEN. 90 Tab 3    phenytoin ER (DILANTIN ER) 100 mg ER capsule Take 3 tabs on odd day and 2 tabs on even days 225 Cap 3    lovastatin (MEVACOR) 40 mg tablet take 1 tablet by mouth once daily WITH A MEAL. PATIENT NEEDS AN APPT BEFORE REFILLS 90 Tab 11     Past Medical History:   Diagnosis Date    Colonoscopy refused 1-7-16    Diabetes [facility])     Fall     Fracture of rib of left side 7-23-15    Hemiparesis ([facility] [address] 1950    left    Hypercholesterolemia     Hypertension     Intellectual disability     S/P colonoscopy 4-17-03    n    Seizures ([facility] [address]      Past Surgical History:   Procedure Laterality Date    HX BREAST BIOPSY Left 2018    needle benign     No Known Allergies      REVIEW OF SYSTEMS:  General: negative for - chills or fever  ENT: negative for - headaches, nasal congestion or tinnitus  Respiratory: negative for - cough, hemoptysis, shortness of breath or wheezing  Cardiovascular : negative for - chest pain, edema, palpitations or shortness of breath  Gastrointestinal: negative for - abdominal pain, blood in stools, heartburn or nausea/vomiting  Genito-Urinary: no dysuria, trouble voiding, or hematuria  Musculoskeletal: negative for - gait disturbance, joint pain, joint stiffness or joint swelling  Neurological: no TIA or stroke symptoms  Hematologic: no bruises, no bleeding, no swollen glands  Integument: no lumps, mole changes, nail changes or rash  Endocrine: no malaise/lethargy or unexpected weight changes      Social History     Socioeconomic History    Marital status: SINGLE     Spouse name: Not on file    Number of children: Not on file    Years of education: Not on file    Highest education level: Not on file   Tobacco Use    Smoking status: Never Smoker    Smokeless tobacco: Never Used   Vaping Use    Vaping Use: Never used   Substance and Sexual Activity    Alcohol use: No    Drug use: No    Sexual activity: Not Currently   Social History Narrative    ** Merged History Encounter **     Medical History: DM2 non-insulin req. 1997primary HTN 1992Dyslipidemia 1992Mental retardation 1950Seizure d/o Left hemiparesis    Obesity     Gyn History: Last mammogram date 2013. Last menstrual perioddate . OB History: Total pregnancies 0. Surgical History: colonoscopy     Hospitalization/Major Diagnostic Procedure: seizure d/o     Family History: Mother: , DM complications, HTN, ASHDFather:  79 yrsChildren: Duane Plush brother(s) , 4 sister(s) . Social History: Alcohol Use Patient does not use alcohol. Smoking Status Patient is a never smoker. Marital Status: Single. Lives w ith:    sister(s). Occupation/W ork: disabled. Education/School: high school.  Sabianist: no.     Social Determinants of Health     Financial Resource Strain:     Difficulty of Paying Living Expenses:    Food Insecurity:     Worried About Running Out of Food in the Last Year:     [address] in the Last Year: Transportation Needs:     Lack of Transportation (Medical):  Lack of Transportation (Non-Medical):    Physical Activity:     Days of Exercise per Week:     Minutes of Exercise per Session:    Stress:     Feeling of Stress :    Social Connections:     Frequency of Communication with Friends and Family:     Frequency of Social Gatherings with Friends and Family:     Attends Oriental orthodox Services:     Active Member of Clubs or Organizations:     Attends Club or Organization Meetings:     Marital Status:      Family History   Problem Relation Age of Onset    Diabetes Mother     No Known Problems Father        OBJECTIVE:    Visit Vitals  /72   Pulse 83   Temp 97.9 °F (36.6 °C) (Oral)   Resp 18   Ht 5' 1\" (1.549 m)   Wt 137 lb 3.2 oz (62.2 kg)   SpO2 98%   BMI 25.92 kg/m²     CONSTITUTIONAL: well , well nourished, appears age appropriate  EYES: perrla, eom intact  ENMT:moist mucous membranes, pharynx clear  NECK: supple. Thyroid normal  RESPIRATORY: Chest: clear bilaterally   CARDIOVASCULAR: Heart: regular rate and rhythm  GASTROINTESTINAL: Abdomen: soft, bowel sounds active  HEMATOLOGIC: no pathological lymph nodes palpated  MUSCULOSKELETAL: Extremities: no edema, pulse 1+Foot exam reveals onychomycosis. Pulses are diminished. Absent sensation to the level of the knee in a stocking-like manner. INTEGUMENT: No unusual rashes or suspicious skin lesions noted. Nails appear normal.  NEUROLOGIC: non-focal exam   MENTAL STATUS: alert and oriented, appropriate affect           ASSESSMENT:  1. Type 2 diabetes mellitus without complication, without long-term current use of insulin (HCC)    2. Seizures ([address]    3. Fall, sequela    4. Intellectual disability    5. Hemiparesis of left nondominant side, unspecified hemiparesis etiology ([address]    6. Essential hypertension    7. Hypercholesterolemia      Blood sugar control will be assessed with hemoglobin A1c. It has been good to date.   We will also look at the urine for protein. She is on Dilantin for seizure disorder. No seizures for some time now. She has not had a fall since we last saw her, which is excellent, and she is no longer using a walker or cane. It is impressive that she is doing better. Neurologic exam is unchanged. Blood pressure control is at goal.  She is taking Lisinopril/HCTZ 10/12.5 daily. Cholesterol controlled with Lovastatin 40 mg daily. She will be back to see us in about four months, sooner if she has any problems. We encouraged her to continue her good work. As noted, she has had her COVID vaccine. I have discussed the diagnosis with the patient and the intended plan as seen in the  Orders. The patient understands and agees with the plan. The patient has   received an after visit summary and questions were answered concerning  future plans  Patient labs and/or xrays were reviewed  Past records were reviewed. PLAN:  .  Orders Placed This Encounter    MICROALBUMIN, UR, RAND W/ MICROALB/CREAT RATIO    HEMOGLOBIN A1C WITH EAG    PHENYTOIN       Follow-up and Dispositions    · Return in about 4 months (around 1/8/2022). ATTENTION:   This medical record was transcribed using an electronic medical records system. Although proofread, it may and can contain electronic and spelling errors. Other human spelling and other errors may be present. Corrections may be executed at a later time. Please feel free to contact us for any clarifications as needed.

## 2021-09-08 NOTE — PROGRESS NOTES
1. Have you been to the ER, urgent care clinic since your last visit? Hospitalized since your last visit? No    2. Have you seen or consulted any other health care providers outside of the [address] [place] [address] since your last visit? Include any pap smears or colon screening.  No     Follow up on fall

## 2021-09-11 LAB
EST. AVERAGE GLUCOSE BLD GHB EST-MCNC: 111 MG/DL
HBA1C MFR BLD: 5.5 % (ref 4–5.6)
PHENYTOIN SERPL-MCNC: 14.3 UG/ML (ref 10–20)

## 2021-10-06 RX ORDER — PHENYTOIN SODIUM 100 MG/1
CAPSULE, EXTENDED RELEASE ORAL
Qty: 225 CAPSULE | Refills: 3 | Status: SHIPPED | OUTPATIENT
Start: 2021-10-06 | End: 2022-10-21

## 2021-10-06 RX ORDER — LISINOPRIL AND HYDROCHLOROTHIAZIDE 10; 12.5 MG/1; MG/1
TABLET ORAL
Qty: 90 TABLET | Refills: 3 | Status: SHIPPED | OUTPATIENT
Start: 2021-10-06 | End: 2022-03-16

## 2021-11-18 ENCOUNTER — OFFICE VISIT (OUTPATIENT)
Dept: INTERNAL MEDICINE CLINIC | Age: 76
End: 2021-11-18
Payer: MEDICARE

## 2021-11-18 VITALS
OXYGEN SATURATION: 98 % | WEIGHT: 141.6 LBS | SYSTOLIC BLOOD PRESSURE: 137 MMHG | HEIGHT: 61 IN | HEART RATE: 90 BPM | RESPIRATION RATE: 20 BRPM | DIASTOLIC BLOOD PRESSURE: 74 MMHG | BODY MASS INDEX: 26.73 KG/M2 | TEMPERATURE: 98 F

## 2021-11-18 DIAGNOSIS — Z00.00 MEDICARE ANNUAL WELLNESS VISIT, SUBSEQUENT: Primary | ICD-10-CM

## 2021-11-18 DIAGNOSIS — I10 PRIMARY HYPERTENSION: ICD-10-CM

## 2021-11-18 DIAGNOSIS — E11.9 TYPE 2 DIABETES MELLITUS WITHOUT COMPLICATION, WITHOUT LONG-TERM CURRENT USE OF INSULIN (HCC): ICD-10-CM

## 2021-11-18 DIAGNOSIS — R56.9 SEIZURES (HCC): ICD-10-CM

## 2021-11-18 DIAGNOSIS — G89.29 HIP PAIN, CHRONIC, LEFT: ICD-10-CM

## 2021-11-18 DIAGNOSIS — F79 INTELLECTUAL DISABILITY: ICD-10-CM

## 2021-11-18 DIAGNOSIS — Z13.31 SCREENING FOR DEPRESSION: ICD-10-CM

## 2021-11-18 DIAGNOSIS — E78.00 HYPERCHOLESTEROLEMIA: ICD-10-CM

## 2021-11-18 DIAGNOSIS — M25.552 HIP PAIN, CHRONIC, LEFT: ICD-10-CM

## 2021-11-18 DIAGNOSIS — G81.94 HEMIPARESIS OF LEFT NONDOMINANT SIDE, UNSPECIFIED HEMIPARESIS ETIOLOGY (HCC): ICD-10-CM

## 2021-11-18 PROCEDURE — 99213 OFFICE O/P EST LOW 20 MIN: CPT | Performed by: INTERNAL MEDICINE

## 2021-11-18 PROCEDURE — G8432 DEP SCR NOT DOC, RNG: HCPCS | Performed by: INTERNAL MEDICINE

## 2021-11-18 PROCEDURE — G0439 PPPS, SUBSEQ VISIT: HCPCS | Performed by: INTERNAL MEDICINE

## 2021-11-18 PROCEDURE — G8427 DOCREV CUR MEDS BY ELIG CLIN: HCPCS | Performed by: INTERNAL MEDICINE

## 2021-11-18 PROCEDURE — G8399 PT W/DXA RESULTS DOCUMENT: HCPCS | Performed by: INTERNAL MEDICINE

## 2021-11-18 PROCEDURE — 1101F PT FALLS ASSESS-DOCD LE1/YR: CPT | Performed by: INTERNAL MEDICINE

## 2021-11-18 PROCEDURE — G8752 SYS BP LESS 140: HCPCS | Performed by: INTERNAL MEDICINE

## 2021-11-18 PROCEDURE — G8419 CALC BMI OUT NRM PARAM NOF/U: HCPCS | Performed by: INTERNAL MEDICINE

## 2021-11-18 PROCEDURE — G8754 DIAS BP LESS 90: HCPCS | Performed by: INTERNAL MEDICINE

## 2021-11-18 PROCEDURE — G8536 NO DOC ELDER MAL SCRN: HCPCS | Performed by: INTERNAL MEDICINE

## 2021-11-18 PROCEDURE — 1090F PRES/ABSN URINE INCON ASSESS: CPT | Performed by: INTERNAL MEDICINE

## 2021-11-18 NOTE — PROGRESS NOTES
1. Have you been to the ER, urgent care clinic since your last visit? Hospitalized since your last visit? No    2. Have you seen or consulted any other health care providers outside of the [address] [place] [address] since your last visit? Include any pap smears or colon screening. No      Wants to discuss left hip pain  This is the Subsequent Medicare Annual Wellness Exam, performed 12 months or more after the Initial AWV or the last Subsequent AWV    I have reviewed the patient's medical history in detail and updated the computerized patient record. Assessment/Plan   Education and counseling provided:  Are appropriate based on today's review and evaluation           Depression Risk Factor Screening     3 most recent PHQ Screens 11/18/2021   PHQ Not Done -   Little interest or pleasure in doing things Not at all   Feeling down, depressed, irritable, or hopeless Not at all   Total Score PHQ 2 0       Alcohol Risk Screen    Do you average more than 1 drink per night or more than 7 drinks a week:  No    On any one occasion in the past three months have you have had more than 3 drinks containing alcohol:  No        Functional Ability and Level of Safety    Hearing: Hearing is good. Activities of Daily Living: The home contains: handrails and grab bars  Patient needs help with:  transportation, shopping, preparing meals, laundry, managing medications, managing money, eating, dressing, hygiene, bathroom needs and walking      Ambulation: with no difficulty     Fall Risk:  Fall Risk Assessment, last 12 mths 11/18/2021   Able to walk? Yes   Fall in past 12 months? 0   Do you feel unsteady? 0   Are you worried about falling 0   Is TUG test greater than 12 seconds? -   Is the gait abnormal? -   Number of falls in past 12 months -   Fall with injury?  -      Abuse Screen:  Patient is not abused       Cognitive Screening    Has your family/caregiver stated any concerns about your memory: no     Cognitive Screening: not necessary    Health Maintenance Due     Health Maintenance Due   Topic Date Due    Shingrix Vaccine Age 49> (1 of 2) Never done    Pneumococcal 65+ years (1 of 1 - PPSV23) Never done    Eye Exam Retinal or Dilated  04/05/2018    MICROALBUMIN Q1  07/18/2018    Flu Vaccine (1) Never done    Medicare Yearly Exam  09/26/2021    COVID-19 Vaccine (3 - Booster for [employer] series) 10/29/2021       Patient Care Team   Patient Care Team:  Lelia العراقي MD as PCP - General (Internal Medicine)  Lelia العراقي MD as PCP - [address] [place] [address] Provider  Lelia العراقي MD (Internal Medicine)    History     Patient Active Problem List   Diagnosis Code    Hypercholesterolemia E78.00    Diabetes ([address] E11.9    Hypertension I10    Seizures ([facility] [address] R56.9   [address] [place]    Fracture of rib of left side S22.32XA    Intellectual disability F79    Hemiparesis (HCC) G81.90     Past Medical History:   Diagnosis Date    Colonoscopy refused 1-7-16    Diabetes [facility])     Fall     Fracture of rib of left side 7-23-15    Hemiparesis ([facility] [address] 1950    left    Hypercholesterolemia     Hypertension     Intellectual disability     S/P colonoscopy 4-17-03    n    Seizures (HCC)       Past Surgical History:   Procedure Laterality Date    HX BREAST BIOPSY Left 2018    needle benign     Current Outpatient Medications   Medication Sig Dispense Refill    phenytoin ER (DILANTIN ER) 100 mg ER capsule take 3 capsules by mouth ON ODD DAYS and 2 capsules ON EVEN DAYS 225 Capsule 3    lisinopril-hydroCHLOROthiazide (PRINZIDE, ZESTORETIC) 10-12.5 mg per tablet take 1 tablet by mouth once daily (PATIENT NEEDS TO BE SEEN) 90 Tablet 3    metFORMIN (GLUCOPHAGE) 500 mg tablet take 1 tablet by mouth once daily WITH BREAKFAST 90 Tablet 3    lovastatin (MEVACOR) 40 mg tablet take 1 tablet by mouth once daily WITH A MEAL. PATIENT NEEDS AN APPT BEFORE REFILLS 90 Tab 11     No Known Allergies    Family History   Problem Relation Age of Onset    Diabetes Mother     No Known Problems Father      Social History     Tobacco Use    Smoking status: Never Smoker    Smokeless tobacco: Never Used   Substance Use Topics    Alcohol use: No         Jeremiah Vargas LPN

## 2021-11-18 NOTE — PROGRESS NOTES
SPORTS MEDICINE AND PRIMARY CARE  Aman Lee MD, [address] [address]  Phone:  [phone]  Fax: [phone]      Chief Complaint   Patient presents with    Annual Wellness Visit         SUBECTIVE:    Karen Pacheco is a 67 y.o. female Patient returns today with her sister with a known history of seizure disorder, diabetes, intellectual disability, primary hypertension, dyslipidemia and is seen for evaluation. Patient returns complaining of left hip discomfort. Tylenol does not seem to help. Patient is seen for evaluation. She has had no seizures since we last saw her. She goes to  during the day. The sister works during the day. Current Outpatient Medications   Medication Sig Dispense Refill    phenytoin ER (DILANTIN ER) 100 mg ER capsule take 3 capsules by mouth ON ODD DAYS and 2 capsules ON EVEN DAYS 225 Capsule 3    lisinopril-hydroCHLOROthiazide (PRINZIDE, ZESTORETIC) 10-12.5 mg per tablet take 1 tablet by mouth once daily (PATIENT NEEDS TO BE SEEN) 90 Tablet 3    metFORMIN (GLUCOPHAGE) 500 mg tablet take 1 tablet by mouth once daily WITH BREAKFAST 90 Tablet 3    lovastatin (MEVACOR) 40 mg tablet take 1 tablet by mouth once daily WITH A MEAL. PATIENT NEEDS AN APPT BEFORE REFILLS 90 Tab 11     Past Medical History:   Diagnosis Date    Colonoscopy refused 1-7-16    Diabetes [facility])     Fall     Fracture of rib of left side 7-23-15    Hemiparesis ([facility] [address] 1950    left    Hypercholesterolemia     Hypertension     Intellectual disability     S/P colonoscopy 4-17-03    n    Seizures ([facility] [address]      Past Surgical History:   Procedure Laterality Date    HX BREAST BIOPSY Left 2018    needle benign     No Known Allergies    REVIEW OF SYSTEMS:   No falls.         Social History     Socioeconomic History    Marital status: SINGLE   Tobacco Use    Smoking status: Never Smoker    Smokeless tobacco: Never Used   Vaping Use    Vaping Use: Never used Substance and Sexual Activity    Alcohol use: No    Drug use: No    Sexual activity: Not Currently   Social History Narrative    ** Merged History Encounter **     Medical History: DM2 non-insulin req. 1997primary HTN 1992Dyslipidemia 1992Mental retardation 1950Seizure d/o 1980Left hemiparesis    Obesity     Gyn History: Last mammogram date 2013. Last menstrual perioddate . OB History: Total pregnancies 0. Surgical History: colonoscopy     Hospitalization/Major Diagnostic Procedure: seizure d/o     Family History: Mother: , DM complications, HTN, ASHDFather:  79 yrsChildren: Jb Halo brother(s) , 4 sister(s) . Social History: Alcohol Use Patient does not use alcohol. Smoking Status Patient is a never smoker. Marital Status: Single. Lives w ith:    sister(s). Occupation/W ork: disabled. Education/School: high school. Adventism: no.   r  Family History   Problem Relation Age of Onset    Diabetes Mother     No Known Problems Father        OBJECTIVE:  Visit Vitals  /74   Pulse 90   Temp 98 °F (36.7 °C) (Oral)   Resp 20   Ht 5' 1\" (1.549 m)   Wt 141 lb 9.6 oz (64.2 kg)   SpO2 98%   BMI 26.76 kg/m²     ENT: perrla,  eom intact  NECK: supple. Thyroid normal  CHEST: clear to ascultation and percussion   HEART: regular rate and rhythm  ABD: soft, bowel sounds active  EXTREMITIES: no edema, pulse 1+     Office Visit on 2021   Component Date Value Ref Range Status    Phenytoin 2021 14.3  10.0 - 20.0 ug/mL Final    Hemoglobin A1c 2021 5.5  4.0 - 5.6 % Final    Comment: NEW METHOD PLEASE NOTE NEW REFERENCE RANGE  (NOTE)  HbA1C Interpretive Ranges  <5.7              Normal  5.7 - 6.4         Consider Prediabetes  >6.5              Consider Diabetes      Est. average glucose 2021 111  mg/dL Final          ASSESSMENT:  1. Medicare annual wellness visit, subsequent    2. Screening for depression    3.  Type 2 diabetes mellitus without complication, without long-term current use of insulin ([address]    4. Hemiparesis of left nondominant side, unspecified hemiparesis etiology ([address]    5. Seizures ([address]    6. Intellectual disability    7. Primary hypertension    8. Hypercholesterolemia    9. Hip pain, chronic, left      We checked blood sugar control with her last visit and it was noted to be 5.5 hemoglobin A1c, which is excellent. The left hemiparesis is unchanged and may be a contributing factor to hip discomfort, which I think on x-ray will be found to be DJD rather than referred lumbar discomfort. As noted above, no recent seizures. BP control is adequate. Cholesterol is at goal with LDL of 86. The hip pain I think is related to DJD. Will try Aleve cautiously. She will be back to see us in three to four months. X-rays requested. I have discussed the diagnosis with the patient and the intended plan as seen in the  orders above. The patient understands and agees with the plan. The patient has   received an after visit summary and questions were answered concerning  future plans  Patient labs and/or xrays were reviewed  Past records were reviewed. PLAN:  .  Orders Placed This Encounter    ANNUAL DEPRESSION SCREEN 8-15 MIN    XR SPINE LUMB 2 OR 3 V    XR HIP LT W OR WO PELV 2-3 VWS    REFERRAL TO PHYSICAL THERAPY       Follow-up and Dispositions    · Return in about 4 months (around 3/18/2022). ATTENTION:   This medical record was transcribed using an electronic medical records system. Although proofread, it may and can contain electronic and spelling errors. Other human spelling and other errors may be present. Corrections may be executed at a later time. Please feel free to contact us for any clarifications as needed.

## 2021-11-18 NOTE — PATIENT INSTRUCTIONS
Medicare Wellness Visit, Female     The best way to live healthy is to have a lifestyle where you eat a well-balanced diet, exercise regularly, limit alcohol use, and quit all forms of tobacco/nicotine, if applicable. Regular preventive services are another way to keep healthy. Preventive services (vaccines, screening tests, monitoring & exams) can help personalize your care plan, which helps you manage your own care. Screening tests can find health problems at the earliest stages, when they are easiest to treat. [facility] follows the current, evidence-based guidelines published by the [facility] ([facility]) when recommending preventive services for our patients. Because we follow these guidelines, sometimes recommendations change over time as research supports it. (For example, mammograms used to be recommended annually. Even though Medicare will still pay for an annual mammogram, the newer guidelines recommend a mammogram every two years for women of average risk). Of course, you and your doctor may decide to screen more often for some diseases, based on your risk and your co-morbidities (chronic disease you are already diagnosed with). Preventive services for you include:  - Medicare offers their members a free annual wellness visit, which is time for you and your primary care provider to discuss and plan for your preventive service needs. Take advantage of this benefit every year!  -All adults over the age of 72 should receive the recommended pneumonia vaccines. Current USPSTF guidelines recommend a series of two vaccines for the best pneumonia protection.   -All adults should have a flu vaccine yearly and a tetanus vaccine every 10 years.   -All adults age 48 and older should receive the shingles vaccines (series of two vaccines).       -All adults age 38-68 who are overweight should have a diabetes screening test once every three years.   -All adults born between [birth year] and 1965 should be screened once for Hepatitis C.  -Other screening tests and preventive services for persons with diabetes include: an eye exam to screen for diabetic retinopathy, a kidney function test, a foot exam, and stricter control over your cholesterol.   -Cardiovascular screening for adults with routine risk involves an electrocardiogram (ECG) at intervals determined by your doctor.   -Colorectal cancer screenings should be done for adults age 54-65 with no increased risk factors for colorectal cancer. There are a number of acceptable methods of screening for this type of cancer. Each test has its own benefits and drawbacks. Discuss with your doctor what is most appropriate for you during your annual wellness visit. The different tests include: colonoscopy (considered the best screening method), a fecal occult blood test, a fecal DNA test, and sigmoidoscopy.    -A bone mass density test is recommended when a woman turns 65 to screen for osteoporosis. This test is only recommended one time, as a screening. Some providers will use this same test as a disease monitoring tool if you already have osteoporosis. -Breast cancer screenings are recommended every other year for women of normal risk, age 54-69.  -Cervical cancer screenings for women over age 72 are only recommended with certain risk factors.      Here is a list of your current Health Maintenance items (your personalized list of preventive services) with a due date:  Health Maintenance Due   Topic Date Due    Shingles Vaccine (1 of 2) Never done    Pneumococcal Vaccine (1 of 1 - PPSV23) Never done    Eye Exam  04/05/2018    Albumin Urine Test  07/18/2018    Yearly Flu Vaccine (1) Never done    COVID-19 Vaccine (3 - Booster for [employer] series) 10/29/2021

## 2021-11-29 RX ORDER — LOVASTATIN 40 MG/1
TABLET ORAL
Qty: 90 TABLET | Refills: 11 | Status: SHIPPED | OUTPATIENT
Start: 2021-11-29 | End: 2022-02-21

## 2022-02-21 RX ORDER — LOVASTATIN 40 MG/1
TABLET ORAL
Qty: 90 TABLET | Refills: 11 | Status: SHIPPED | OUTPATIENT
Start: 2022-02-21

## 2022-03-16 ENCOUNTER — TRANSCRIBE ORDER (OUTPATIENT)
Dept: SCHEDULING | Age: 77
End: 2022-03-16

## 2022-03-16 ENCOUNTER — OFFICE VISIT (OUTPATIENT)
Dept: INTERNAL MEDICINE CLINIC | Age: 77
End: 2022-03-16
Payer: MEDICARE

## 2022-03-16 VITALS
DIASTOLIC BLOOD PRESSURE: 70 MMHG | BODY MASS INDEX: 27.3 KG/M2 | SYSTOLIC BLOOD PRESSURE: 125 MMHG | WEIGHT: 144.6 LBS | HEART RATE: 83 BPM | TEMPERATURE: 98 F | OXYGEN SATURATION: 97 % | RESPIRATION RATE: 20 BRPM | HEIGHT: 61 IN

## 2022-03-16 DIAGNOSIS — E11.9 TYPE 2 DIABETES MELLITUS WITHOUT COMPLICATION, WITHOUT LONG-TERM CURRENT USE OF INSULIN (HCC): ICD-10-CM

## 2022-03-16 DIAGNOSIS — Z12.31 VISIT FOR SCREENING MAMMOGRAM: Primary | ICD-10-CM

## 2022-03-16 DIAGNOSIS — R56.9 SEIZURES (HCC): ICD-10-CM

## 2022-03-16 DIAGNOSIS — E55.9 VITAMIN D DEFICIENCY, UNSPECIFIED: ICD-10-CM

## 2022-03-16 DIAGNOSIS — F79 INTELLECTUAL DISABILITY: ICD-10-CM

## 2022-03-16 DIAGNOSIS — E78.00 HYPERCHOLESTEROLEMIA: ICD-10-CM

## 2022-03-16 DIAGNOSIS — I10 PRIMARY HYPERTENSION: Primary | ICD-10-CM

## 2022-03-16 DIAGNOSIS — G81.94 HEMIPARESIS OF LEFT NONDOMINANT SIDE, UNSPECIFIED HEMIPARESIS ETIOLOGY (HCC): ICD-10-CM

## 2022-03-16 PROCEDURE — G8399 PT W/DXA RESULTS DOCUMENT: HCPCS | Performed by: INTERNAL MEDICINE

## 2022-03-16 PROCEDURE — 1090F PRES/ABSN URINE INCON ASSESS: CPT | Performed by: INTERNAL MEDICINE

## 2022-03-16 PROCEDURE — 3044F HG A1C LEVEL LT 7.0%: CPT | Performed by: INTERNAL MEDICINE

## 2022-03-16 PROCEDURE — G8752 SYS BP LESS 140: HCPCS | Performed by: INTERNAL MEDICINE

## 2022-03-16 PROCEDURE — G8432 DEP SCR NOT DOC, RNG: HCPCS | Performed by: INTERNAL MEDICINE

## 2022-03-16 PROCEDURE — 1101F PT FALLS ASSESS-DOCD LE1/YR: CPT | Performed by: INTERNAL MEDICINE

## 2022-03-16 PROCEDURE — G8419 CALC BMI OUT NRM PARAM NOF/U: HCPCS | Performed by: INTERNAL MEDICINE

## 2022-03-16 PROCEDURE — G8536 NO DOC ELDER MAL SCRN: HCPCS | Performed by: INTERNAL MEDICINE

## 2022-03-16 PROCEDURE — G8427 DOCREV CUR MEDS BY ELIG CLIN: HCPCS | Performed by: INTERNAL MEDICINE

## 2022-03-16 PROCEDURE — 99214 OFFICE O/P EST MOD 30 MIN: CPT | Performed by: INTERNAL MEDICINE

## 2022-03-16 PROCEDURE — G8754 DIAS BP LESS 90: HCPCS | Performed by: INTERNAL MEDICINE

## 2022-03-16 RX ORDER — TELMISARTAN AND HYDROCHLORTHIAZIDE 40; 12.5 MG/1; MG/1
1 TABLET ORAL DAILY
Qty: 30 TABLET | Refills: 11 | Status: SHIPPED | OUTPATIENT
Start: 2022-03-16

## 2022-03-16 NOTE — PROGRESS NOTES
SPORTS MEDICINE AND PRIMARY CARE  Sasha Mitchell MD, [address] [address]  Phone:  [phone]  Fax: [phone]       Chief Complaint   Patient presents with    Hypertension   . SUBJECTIVE:    Estephania Shannon is a 65 y.o. female Patient returns today with her sister voicing no new complaints. She is seen for evaluation. She has a known history of primary hypertension, seizure disorder, type 2 diabetes. She has intellectual disability, dyslipidemia, and is seen for evaluation. Current Outpatient Medications   Medication Sig Dispense Refill    telmisartan-hydroCHLOROthiazide (MICARDIS HCT) 40-12.5 mg per tablet Take 1 Tablet by mouth daily.  30 Tablet 11    lovastatin (MEVACOR) 40 mg tablet take 1 tablet by mouth once daily WITH A MEAL PATIENT NEEDS AN APPOINTMENT BEFORE REFILLS 90 Tablet 11    phenytoin ER (DILANTIN ER) 100 mg ER capsule take 3 capsules by mouth ON ODD DAYS and 2 capsules ON EVEN DAYS 225 Capsule 3    metFORMIN (GLUCOPHAGE) 500 mg tablet take 1 tablet by mouth once daily WITH BREAKFAST 90 Tablet 3     Past Medical History:   Diagnosis Date    Colonoscopy refused 1-7-16    Diabetes [facility])     Fall     Fracture of rib of left side 7-23-15    Hemiparesis ([facility] [address] 1950    left    Hypercholesterolemia     Hypertension     Intellectual disability     S/P colonoscopy 4-17-03    n    Seizures ([facility] [address]      Past Surgical History:   Procedure Laterality Date    HX BREAST BIOPSY Left 2018    needle benign     No Known Allergies      REVIEW OF SYSTEMS:  General: negative for - chills or fever  ENT: negative for - headaches, nasal congestion or tinnitus  Respiratory: negative for - cough, hemoptysis, shortness of breath or wheezing  Cardiovascular : negative for - chest pain, edema, palpitations or shortness of breath  Gastrointestinal: negative for - abdominal pain, blood in stools, heartburn or nausea/vomiting  Genito-Urinary: no dysuria, trouble voiding, or hematuria  Musculoskeletal: negative for - gait disturbance, joint pain, joint stiffness or joint swelling  Neurological: no TIA or stroke symptoms  Hematologic: no bruises, no bleeding, no swollen glands  Integument: no lumps, mole changes, nail changes or rash  Endocrine: no malaise/lethargy or unexpected weight changes      Social History     Socioeconomic History    Marital status: SINGLE   Tobacco Use    Smoking status: Never Smoker    Smokeless tobacco: Never Used   Vaping Use    Vaping Use: Never used   Substance and Sexual Activity    Alcohol use: No    Drug use: No    Sexual activity: Not Currently   Social History Narrative    ** Merged History Encounter **     Medical History: DM2 non-insulin req. 1997primary HTN 1992Dyslipidemia 1992Mental retardation 1950Seizure d/o Left hemiparesis    Obesity     Gyn History: Last mammogram date 2013. Last menstrual perioddate . OB History: Total pregnancies 0. Surgical History: colonoscopy     Hospitalization/Major Diagnostic Procedure: seizure d/o     Family History: Mother: , DM complications, HTN, ASHDFather:  79 yrsChildren: Jeanette Loo brother(s) , 4 sister(s) . Social History: Alcohol Use Patient does not use alcohol. Smoking Status Patient is a never smoker. Marital Status: Single. Lives w ith:    sister(s). Occupation/W ork: disabled. Education/School: high school. Yarsani: no.     Family History   Problem Relation Age of Onset    Diabetes Mother     No Known Problems Father        OBJECTIVE:    Visit Vitals  /70   Pulse 83   Temp 98 °F (36.7 °C) (Oral)   Resp 20   Ht 5' 1\" (1.549 m)   Wt 144 lb 9.6 oz (65.6 kg)   SpO2 97%   BMI 27.32 kg/m²     CONSTITUTIONAL: well , well nourished, appears age appropriate  EYES: perrla, eom intact  ENMT:moist mucous membranes, pharynx clear  NECK: supple.  Thyroid normal  RESPIRATORY: Chest: clear bilaterally   CARDIOVASCULAR: Heart: regular rate and rhythm  GASTROINTESTINAL: Abdomen: soft, bowel sounds active  HEMATOLOGIC: no pathological lymph nodes palpated  MUSCULOSKELETAL: Extremities: no edema, pulse 1+   INTEGUMENT: No unusual rashes or suspicious skin lesions noted. Nails appear normal.  NEUROLOGIC: non-focal exam   MENTAL STATUS: alert and oriented, appropriate affect           ASSESSMENT:  1. Primary hypertension    2. Type 2 diabetes mellitus without complication, without long-term current use of insulin (HCC)    3. Seizures ([facility] [facility]    4. Vitamin D deficiency, unspecified     5. Hemiparesis of left nondominant side, unspecified hemiparesis etiology ([address]    6. Hypercholesterolemia    7. Intellectual disability      Blood pressure control is at goal.  I am going to stop the Lisinopril and place her on Micardis 40/12.5. Blood sugar control is at goal with Metformin. There has been no seizure for years now, she is on Dilantin. We will check a level. We will also check a vitamin D level. She has hemiparesis on the left. We will assess her lipid panel for dyslipidemia. She has intellectual disability and is virtually aphasic. She will be back to see us in six months, sooner if she has any problems. I have discussed the diagnosis with the patient and the intended plan as seen in the  Orders. The patient understands and agees with the plan. The patient has   received an after visit summary and questions were answered concerning  future plans  Patient labs and/or xrays were reviewed  Past records were reviewed.     PLAN:  .  Orders Placed This Encounter    URINALYSIS W/ RFLX MICROSCOPIC    CBC WITH AUTOMATED DIFF    METABOLIC PANEL, COMPREHENSIVE    LIPID PANEL    HEMOGLOBIN A1C WITH EAG    MICROALBUMIN, UR, RAND W/ MICROALB/CREAT RATIO    VITAMIN D, 25 HYDROXY    PHENYTOIN    telmisartan-hydroCHLOROthiazide (MICARDIS HCT) 40-12.5 mg per tablet       Follow-up and Dispositions    · Return in about 6 months (around 9/16/2022). ATTENTION:   This medical record was transcribed using an electronic medical records system. Although proofread, it may and can contain electronic and spelling errors. Other human spelling and other errors may be present. Corrections may be executed at a later time. Please feel free to contact us for any clarifications as needed.

## 2022-03-16 NOTE — PROGRESS NOTES
1. Have you been to the ER, urgent care clinic since your last visit? Hospitalized since your last visit? No    2. Have you seen or consulted any other health care providers outside of the [address] [place] [address] since your last visit? Include any pap smears or colon screening.  No

## 2022-03-17 LAB
25(OH)D3 SERPL-MCNC: 12.1 NG/ML (ref 30–100)
ALBUMIN SERPL-MCNC: 3.5 G/DL (ref 3.5–5)
ALBUMIN/GLOB SERPL: 0.9 {RATIO} (ref 1.1–2.2)
ALP SERPL-CCNC: 70 U/L (ref 45–117)
ALT SERPL-CCNC: 21 U/L (ref 12–78)
ANION GAP SERPL CALC-SCNC: 2 MMOL/L (ref 5–15)
APPEARANCE UR: CLEAR
AST SERPL-CCNC: 6 U/L (ref 15–37)
BACTERIA URNS QL MICRO: ABNORMAL /HPF
BASOPHILS # BLD: 0 K/UL (ref 0–0.1)
BASOPHILS NFR BLD: 0 % (ref 0–1)
BILIRUB SERPL-MCNC: 0.2 MG/DL (ref 0.2–1)
BILIRUB UR QL: NEGATIVE
BUN SERPL-MCNC: 20 MG/DL (ref 6–20)
BUN/CREAT SERPL: 25 (ref 12–20)
CALCIUM SERPL-MCNC: 8.9 MG/DL (ref 8.5–10.1)
CHLORIDE SERPL-SCNC: 103 MMOL/L (ref 97–108)
CHOLEST SERPL-MCNC: 199 MG/DL
CO2 SERPL-SCNC: 32 MMOL/L (ref 21–32)
COLOR UR: ABNORMAL
CREAT SERPL-MCNC: 0.8 MG/DL (ref 0.55–1.02)
CREAT UR-MCNC: 65.6 MG/DL
DIFFERENTIAL METHOD BLD: NORMAL
EOSINOPHIL # BLD: 0.2 K/UL (ref 0–0.4)
EOSINOPHIL NFR BLD: 4 % (ref 0–7)
EPITH CASTS URNS QL MICRO: ABNORMAL /LPF
ERYTHROCYTE [DISTWIDTH] IN BLOOD BY AUTOMATED COUNT: 12.6 % (ref 11.5–14.5)
EST. AVERAGE GLUCOSE BLD GHB EST-MCNC: 111 MG/DL
GLOBULIN SER CALC-MCNC: 4.1 G/DL (ref 2–4)
GLUCOSE SERPL-MCNC: 80 MG/DL (ref 65–100)
GLUCOSE UR STRIP.AUTO-MCNC: NEGATIVE MG/DL
HBA1C MFR BLD: 5.5 % (ref 4–5.6)
HCT VFR BLD AUTO: 37.4 % (ref 35–47)
HDLC SERPL-MCNC: 86 MG/DL
HDLC SERPL: 2.3 {RATIO} (ref 0–5)
HGB BLD-MCNC: 11.9 G/DL (ref 11.5–16)
HGB UR QL STRIP: NEGATIVE
HYALINE CASTS URNS QL MICRO: ABNORMAL /LPF (ref 0–5)
IMM GRANULOCYTES # BLD AUTO: 0 K/UL (ref 0–0.04)
IMM GRANULOCYTES NFR BLD AUTO: 0 % (ref 0–0.5)
KETONES UR QL STRIP.AUTO: NEGATIVE MG/DL
LDLC SERPL CALC-MCNC: 105.8 MG/DL (ref 0–100)
LEUKOCYTE ESTERASE UR QL STRIP.AUTO: ABNORMAL
LYMPHOCYTES # BLD: 2.2 K/UL (ref 0.8–3.5)
LYMPHOCYTES NFR BLD: 37 % (ref 12–49)
MCH RBC QN AUTO: 30.7 PG (ref 26–34)
MCHC RBC AUTO-ENTMCNC: 31.8 G/DL (ref 30–36.5)
MCV RBC AUTO: 96.6 FL (ref 80–99)
MICROALBUMIN UR-MCNC: 0.63 MG/DL
MICROALBUMIN/CREAT UR-RTO: 10 MG/G (ref 0–30)
MONOCYTES # BLD: 0.5 K/UL (ref 0–1)
MONOCYTES NFR BLD: 8 % (ref 5–13)
NEUTS SEG # BLD: 3 K/UL (ref 1.8–8)
NEUTS SEG NFR BLD: 51 % (ref 32–75)
NITRITE UR QL STRIP.AUTO: NEGATIVE
NRBC # BLD: 0 K/UL (ref 0–0.01)
NRBC BLD-RTO: 0 PER 100 WBC
PH UR STRIP: 5.5 [PH] (ref 5–8)
PHENYTOIN SERPL-MCNC: 12.8 UG/ML (ref 10–20)
PLATELET # BLD AUTO: 217 K/UL (ref 150–400)
PMV BLD AUTO: 9.2 FL (ref 8.9–12.9)
POTASSIUM SERPL-SCNC: 3.8 MMOL/L (ref 3.5–5.1)
PROT SERPL-MCNC: 7.6 G/DL (ref 6.4–8.2)
PROT UR STRIP-MCNC: NEGATIVE MG/DL
RBC # BLD AUTO: 3.87 M/UL (ref 3.8–5.2)
RBC #/AREA URNS HPF: ABNORMAL /HPF (ref 0–5)
SODIUM SERPL-SCNC: 137 MMOL/L (ref 136–145)
SP GR UR REFRACTOMETRY: 1.01 (ref 1–1.03)
TRIGL SERPL-MCNC: 36 MG/DL (ref ?–150)
UROBILINOGEN UR QL STRIP.AUTO: 0.2 EU/DL (ref 0.2–1)
VLDLC SERPL CALC-MCNC: 7.2 MG/DL
WBC # BLD AUTO: 5.9 K/UL (ref 3.6–11)
WBC URNS QL MICRO: ABNORMAL /HPF (ref 0–4)

## 2022-03-17 RX ORDER — ERGOCALCIFEROL 1.25 MG/1
50000 CAPSULE ORAL
Qty: 4 CAPSULE | Refills: 4 | Status: SHIPPED | OUTPATIENT
Start: 2022-03-17

## 2022-03-23 ENCOUNTER — HOSPITAL ENCOUNTER (OUTPATIENT)
Dept: MAMMOGRAPHY | Age: 77
Discharge: HOME | End: 2022-03-23
Attending: INTERNAL MEDICINE
Payer: MEDICARE

## 2022-03-23 DIAGNOSIS — Z12.31 VISIT FOR SCREENING MAMMOGRAM: ICD-10-CM

## 2022-03-23 PROCEDURE — 77067 SCR MAMMO BI INCL CAD: CPT

## 2022-05-26 RX ORDER — METFORMIN HYDROCHLORIDE 500 MG/1
TABLET ORAL
Qty: 90 TABLET | Refills: 3 | Status: SHIPPED | OUTPATIENT
Start: 2022-05-26

## 2022-09-21 ENCOUNTER — OFFICE VISIT (OUTPATIENT)
Dept: INTERNAL MEDICINE CLINIC | Age: 77
End: 2022-09-21
Payer: MEDICARE

## 2022-09-21 VITALS
HEIGHT: 61 IN | SYSTOLIC BLOOD PRESSURE: 149 MMHG | DIASTOLIC BLOOD PRESSURE: 75 MMHG | RESPIRATION RATE: 18 BRPM | WEIGHT: 144.9 LBS | TEMPERATURE: 98 F | OXYGEN SATURATION: 98 % | BODY MASS INDEX: 27.36 KG/M2 | HEART RATE: 82 BPM

## 2022-09-21 DIAGNOSIS — E78.00 HYPERCHOLESTEROLEMIA: ICD-10-CM

## 2022-09-21 DIAGNOSIS — G81.94 HEMIPARESIS OF LEFT NONDOMINANT SIDE, UNSPECIFIED HEMIPARESIS ETIOLOGY (HCC): ICD-10-CM

## 2022-09-21 DIAGNOSIS — I10 PRIMARY HYPERTENSION: ICD-10-CM

## 2022-09-21 DIAGNOSIS — E11.9 TYPE 2 DIABETES MELLITUS WITHOUT COMPLICATION, WITHOUT LONG-TERM CURRENT USE OF INSULIN (HCC): Primary | ICD-10-CM

## 2022-09-21 DIAGNOSIS — E55.9 VITAMIN D DEFICIENCY, UNSPECIFIED: ICD-10-CM

## 2022-09-21 DIAGNOSIS — E04.1 THYROID NODULE: ICD-10-CM

## 2022-09-21 DIAGNOSIS — R56.9 SEIZURES (HCC): ICD-10-CM

## 2022-09-21 PROCEDURE — G8417 CALC BMI ABV UP PARAM F/U: HCPCS | Performed by: INTERNAL MEDICINE

## 2022-09-21 PROCEDURE — G8536 NO DOC ELDER MAL SCRN: HCPCS | Performed by: INTERNAL MEDICINE

## 2022-09-21 PROCEDURE — G8753 SYS BP > OR = 140: HCPCS | Performed by: INTERNAL MEDICINE

## 2022-09-21 PROCEDURE — G8427 DOCREV CUR MEDS BY ELIG CLIN: HCPCS | Performed by: INTERNAL MEDICINE

## 2022-09-21 PROCEDURE — 1090F PRES/ABSN URINE INCON ASSESS: CPT | Performed by: INTERNAL MEDICINE

## 2022-09-21 PROCEDURE — 3044F HG A1C LEVEL LT 7.0%: CPT | Performed by: INTERNAL MEDICINE

## 2022-09-21 PROCEDURE — 1101F PT FALLS ASSESS-DOCD LE1/YR: CPT | Performed by: INTERNAL MEDICINE

## 2022-09-21 PROCEDURE — G8754 DIAS BP LESS 90: HCPCS | Performed by: INTERNAL MEDICINE

## 2022-09-21 PROCEDURE — G8510 SCR DEP NEG, NO PLAN REQD: HCPCS | Performed by: INTERNAL MEDICINE

## 2022-09-21 PROCEDURE — 99214 OFFICE O/P EST MOD 30 MIN: CPT | Performed by: INTERNAL MEDICINE

## 2022-09-21 PROCEDURE — G8399 PT W/DXA RESULTS DOCUMENT: HCPCS | Performed by: INTERNAL MEDICINE

## 2022-09-21 PROCEDURE — 1123F ACP DISCUSS/DSCN MKR DOCD: CPT | Performed by: INTERNAL MEDICINE

## 2022-09-21 NOTE — PROGRESS NOTES
Aurea Pretty is a 65 y.o. female      Chief Complaint   Patient presents with    Diabetes    Hypertension     1. Have you been to the ER, urgent care clinic since your last visit? Hospitalized since your last visit? No    2. Have you seen or consulted any other health care providers outside of the [address] [place] [address] since your last visit? Include any pap smears or colon screening.  No

## 2022-09-21 NOTE — PROGRESS NOTES
SPORTS MEDICINE AND PRIMARY CARE  Anette Bello MD, [address] [address]  Phone:  [phone]  Fax: [phone]       Chief Complaint   Patient presents with    Diabetes    Hypertension   . SUBJECTIVE:    Jessica Hodge is a 65 y.o. female Patient returns today with his sister with a history of type 2 diabetes, dyslipidemia, primary hypertension, seizure disorder, hemiparesis, and is seen for evaluation. Patient has had no seizures since we last saw him and is seen for evaluation. Current Outpatient Medications   Medication Sig Dispense Refill    metFORMIN (GLUCOPHAGE) 500 mg tablet take 1 tablet by mouth once daily with breakfast 90 Tablet 3    ergocalciferol (ERGOCALCIFEROL) 1,250 mcg (50,000 unit) capsule Take 1 Capsule by mouth every seven (7) days. 4 Capsule 4    telmisartan-hydroCHLOROthiazide (MICARDIS HCT) 40-12.5 mg per tablet Take 1 Tablet by mouth daily.  30 Tablet 11    lovastatin (MEVACOR) 40 mg tablet take 1 tablet by mouth once daily WITH A MEAL PATIENT NEEDS AN APPOINTMENT BEFORE REFILLS 90 Tablet 11    phenytoin ER (DILANTIN ER) 100 mg ER capsule take 3 capsules by mouth ON ODD DAYS and 2 capsules ON EVEN DAYS 225 Capsule 3     Past Medical History:   Diagnosis Date    Colonoscopy refused 01/07/2016    Diabetes ([address]     Fall     Fracture of rib of left side 07/23/2015    Hemiparesis ([address] 1950    left    Hypercholesterolemia     Hypertension     Intellectual disability     S/P colonoscopy 04/17/2003    n    Seizures ([facility]     Thyroid nodule 09/21/2022     Past Surgical History:   Procedure Laterality Date    HX BREAST BIOPSY Left 2018    needle benign     No Known Allergies      REVIEW OF SYSTEMS:  General: negative for - chills or fever  ENT: negative for - headaches, nasal congestion or tinnitus  Respiratory: negative for - cough, hemoptysis, shortness of breath or wheezing  Cardiovascular : negative for - chest pain, edema, palpitations or shortness of breath  Gastrointestinal: negative for - abdominal pain, blood in stools, heartburn or nausea/vomiting  Genito-Urinary: no dysuria, trouble voiding, or hematuria  Musculoskeletal: negative for - gait disturbance, joint pain, joint stiffness or joint swelling  Neurological: no TIA or stroke symptoms  Hematologic: no bruises, no bleeding, no swollen glands  Integument: no lumps, mole changes, nail changes or rash  Endocrine: no malaise/lethargy or unexpected weight changes      Social History     Socioeconomic History    Marital status: SINGLE   Tobacco Use    Smoking status: Never    Smokeless tobacco: Never   Vaping Use    Vaping Use: Never used   Substance and Sexual Activity    Alcohol use: No    Drug use: No    Sexual activity: Not Currently   Social History Narrative    ** Merged History Encounter **     Medical History: DM2 non-insulin req. primary HTN Dyslipidemia 1992Mental retardation 1950Seizure d/o Left hemiparesis    Obesity     Gyn History: Last mammogram date 2013. Last menstrual perioddate . OB History: Total pregnancies 0. Surgical History: colonoscopy     Hospitalization/Major Diagnostic Procedure: seizure d/o     Family History: Mother: , DM complications, HTN, ASHDFather:  79 yrsChildren: Lavon Plummer brother(s) , 4 sister(s) . Social History: Alcohol Use Patient does not use alcohol. Smoking Status Patient is a never smoker. Marital Status: Single. Lives w ith:    sister(s). Occupation/W ork: disabled. Education/School: high school.  Amish: no.     Family History   Problem Relation Age of Onset    Diabetes Mother     No Known Problems Father        OBJECTIVE:    Visit Vitals  BP (!) 149/75 (BP 1 Location: Right arm, BP Patient Position: Sitting)   Pulse 82   Temp 98 °F (36.7 °C) (Oral)   Resp 18   Ht 5' 1\" (1.549 m)   Wt 144 lb 14.4 oz (65.7 kg)   SpO2 98%   BMI 27.38 kg/m²     CONSTITUTIONAL: well , well nourished, appears age appropriate  EYES: perrla, eom intact  ENMT:moist mucous membranes, pharynx clear  NECK: supple. Thyroid Small nodule on the left. RESPIRATORY: Chest: clear bilaterally   CARDIOVASCULAR: Heart: regular rate and rhythm  GASTROINTESTINAL: Abdomen: soft, bowel sounds active  HEMATOLOGIC: no pathological lymph nodes palpated  MUSCULOSKELETAL: Extremities: no edema, pulse 1+ Foot exam reveals no lesions, vibratory sense is intact, pulses are intact. INTEGUMENT: No unusual rashes or suspicious skin lesions noted. Nails appear normal.  NEUROLOGIC: non-focal exam   MENTAL STATUS: alert and oriented, appropriate affect           ASSESSMENT:  1. Type 2 diabetes mellitus without complication, without long-term current use of insulin ([address]    2. Hypercholesterolemia    3. Primary hypertension    4. Seizures ([address]    5. Hemiparesis of left nondominant side, unspecified hemiparesis etiology ([address]    6. Thyroid nodule    7. Vitamin D deficiency, unspecified       Will assess blood sugar control with a hemoglobin A1c. She is currently on Metformin 500 mg daily with normal blood sugars. Blood pressure control is at goal on telmisartan 40/12. 5. Cholesterol will be checked today. Last cholesterol was slightly elevated at 106 LDL. If it is elevated again, we would suggest that she stop the Lovastatin and place her on a different statin. No seizures. We will check a Dilantin level to confirm. History of vitamin D deficiency. She has been taking vitamin D and I suspect we can stop that but we will await until we get the lab results. She will be back to see me in 6 months, sooner if she has any problems. She has a left thyroid nodule which will be investigated with an ultrasound. I have discussed the diagnosis with the patient and the intended plan as seen in the  Orders. The patient understands and agees with the plan.   The patient has   received an after visit summary and questions were answered concerning  future plans  Patient labs and/or xrays were reviewed  Past records were reviewed. PLAN:  .  Orders Placed This Encounter    US THYROID/PARATHYROID/SOFT TISS    PHENYTOIN    HEMOGLOBIN A1C WITH EAG    VITAMIN D, 25 HYDROXY         Follow-up and Dispositions    Return in about 6 months (around 3/21/2023). ATTENTION:   This medical record was transcribed using an electronic medical records system. Although proofread, it may and can contain electronic and spelling errors. Other human spelling and other errors may be present. Corrections may be executed at a later time. Please feel free to contact us for any clarifications as needed.

## 2022-09-23 LAB
25(OH)D3 SERPL-MCNC: 18.5 NG/ML (ref 30–100)
COMMENT, HOLDF: NORMAL
EST. AVERAGE GLUCOSE BLD GHB EST-MCNC: 105 MG/DL
HBA1C MFR BLD: 5.3 % (ref 4–5.6)
SAMPLES BEING HELD,HOLD: NORMAL

## 2022-10-21 RX ORDER — PHENYTOIN SODIUM 100 MG/1
CAPSULE, EXTENDED RELEASE ORAL
Qty: 225 CAPSULE | Refills: 3 | Status: SHIPPED | OUTPATIENT
Start: 2022-10-21

## 2022-10-26 ENCOUNTER — HOSPITAL ENCOUNTER (OUTPATIENT)
Dept: ULTRASOUND IMAGING | Age: 77
Discharge: HOME | End: 2022-10-26
Attending: INTERNAL MEDICINE
Payer: MEDICARE

## 2022-10-26 PROCEDURE — 76536 US EXAM OF HEAD AND NECK: CPT

## 2022-10-27 DIAGNOSIS — E04.1 THYROID NODULE: Primary | ICD-10-CM

## 2022-12-14 ENCOUNTER — HOSPITAL ENCOUNTER (OUTPATIENT)
Dept: ULTRASOUND IMAGING | Age: 77
Discharge: HOME | End: 2022-12-14
Attending: INTERNAL MEDICINE
Payer: MEDICARE

## 2022-12-14 DIAGNOSIS — E04.1 THYROID NODULE: ICD-10-CM

## 2022-12-14 PROCEDURE — 74011000250 HC RX REV CODE- 250: Performed by: STUDENT IN AN ORGANIZED HEALTH CARE EDUCATION/TRAINING PROGRAM

## 2022-12-14 PROCEDURE — 77030014115

## 2022-12-14 PROCEDURE — 10005 FNA BX W/US GDN 1ST LES: CPT

## 2022-12-14 PROCEDURE — 77030003666 HC NDL SPINAL BD -A

## 2022-12-14 RX ORDER — SODIUM BICARBONATE 42 MG/ML
2 INJECTION, SOLUTION INTRAVENOUS
Status: COMPLETED | OUTPATIENT
Start: 2022-12-14 | End: 2022-12-14

## 2022-12-14 RX ORDER — LIDOCAINE HYDROCHLORIDE 10 MG/ML
10 INJECTION, SOLUTION EPIDURAL; INFILTRATION; INTRACAUDAL; PERINEURAL
Status: COMPLETED | OUTPATIENT
Start: 2022-12-14 | End: 2022-12-14

## 2022-12-14 RX ADMIN — SODIUM BICARBONATE 84 MG: 42 INJECTION, SOLUTION INTRAVENOUS at 14:53

## 2022-12-14 RX ADMIN — LIDOCAINE HYDROCHLORIDE 10 ML: 10 INJECTION, SOLUTION EPIDURAL; INFILTRATION; INTRACAUDAL; PERINEURAL at 14:53

## 2023-01-20 RX ORDER — TELMISARTAN AND HYDROCHLORTHIAZIDE 40; 12.5 MG/1; MG/1
1 TABLET ORAL DAILY
Qty: 90 TABLET | Refills: 3 | Status: SHIPPED | OUTPATIENT
Start: 2023-01-20

## 2023-03-21 ENCOUNTER — OFFICE VISIT (OUTPATIENT)
Dept: INTERNAL MEDICINE CLINIC | Age: 78
End: 2023-03-21
Payer: MEDICARE

## 2023-03-21 VITALS
DIASTOLIC BLOOD PRESSURE: 68 MMHG | WEIGHT: 151.7 LBS | SYSTOLIC BLOOD PRESSURE: 117 MMHG | HEART RATE: 87 BPM | HEIGHT: 61 IN | OXYGEN SATURATION: 99 % | BODY MASS INDEX: 28.64 KG/M2 | TEMPERATURE: 98.5 F | RESPIRATION RATE: 18 BRPM

## 2023-03-21 DIAGNOSIS — R56.9 SEIZURES (HCC): ICD-10-CM

## 2023-03-21 DIAGNOSIS — Z13.39 SCREENING FOR ALCOHOLISM: ICD-10-CM

## 2023-03-21 DIAGNOSIS — G81.94 HEMIPARESIS OF LEFT NONDOMINANT SIDE, UNSPECIFIED HEMIPARESIS ETIOLOGY (HCC): ICD-10-CM

## 2023-03-21 DIAGNOSIS — F79 INTELLECTUAL DISABILITY: ICD-10-CM

## 2023-03-21 DIAGNOSIS — E04.1 THYROID NODULE: ICD-10-CM

## 2023-03-21 DIAGNOSIS — Z12.31 ENCOUNTER FOR SCREENING MAMMOGRAM FOR MALIGNANT NEOPLASM OF BREAST: ICD-10-CM

## 2023-03-21 DIAGNOSIS — E78.00 HYPERCHOLESTEROLEMIA: ICD-10-CM

## 2023-03-21 DIAGNOSIS — Z00.00 MEDICARE ANNUAL WELLNESS VISIT, SUBSEQUENT: Primary | ICD-10-CM

## 2023-03-21 DIAGNOSIS — I10 PRIMARY HYPERTENSION: ICD-10-CM

## 2023-03-21 DIAGNOSIS — E11.9 TYPE 2 DIABETES MELLITUS WITHOUT COMPLICATION, WITHOUT LONG-TERM CURRENT USE OF INSULIN (HCC): ICD-10-CM

## 2023-03-21 PROCEDURE — 1123F ACP DISCUSS/DSCN MKR DOCD: CPT | Performed by: INTERNAL MEDICINE

## 2023-03-21 PROCEDURE — 3074F SYST BP LT 130 MM HG: CPT | Performed by: INTERNAL MEDICINE

## 2023-03-21 PROCEDURE — G8536 NO DOC ELDER MAL SCRN: HCPCS | Performed by: INTERNAL MEDICINE

## 2023-03-21 PROCEDURE — G8417 CALC BMI ABV UP PARAM F/U: HCPCS | Performed by: INTERNAL MEDICINE

## 2023-03-21 PROCEDURE — 3078F DIAST BP <80 MM HG: CPT | Performed by: INTERNAL MEDICINE

## 2023-03-21 PROCEDURE — 1101F PT FALLS ASSESS-DOCD LE1/YR: CPT | Performed by: INTERNAL MEDICINE

## 2023-03-21 PROCEDURE — G8510 SCR DEP NEG, NO PLAN REQD: HCPCS | Performed by: INTERNAL MEDICINE

## 2023-03-21 PROCEDURE — 99213 OFFICE O/P EST LOW 20 MIN: CPT | Performed by: INTERNAL MEDICINE

## 2023-03-21 PROCEDURE — G8399 PT W/DXA RESULTS DOCUMENT: HCPCS | Performed by: INTERNAL MEDICINE

## 2023-03-21 PROCEDURE — 1090F PRES/ABSN URINE INCON ASSESS: CPT | Performed by: INTERNAL MEDICINE

## 2023-03-21 PROCEDURE — G8427 DOCREV CUR MEDS BY ELIG CLIN: HCPCS | Performed by: INTERNAL MEDICINE

## 2023-03-21 PROCEDURE — G0439 PPPS, SUBSEQ VISIT: HCPCS | Performed by: INTERNAL MEDICINE

## 2023-03-21 RX ORDER — LOVASTATIN 40 MG/1
40 TABLET ORAL
Qty: 90 TABLET | Refills: 3 | Status: SHIPPED | OUTPATIENT
Start: 2023-03-21

## 2023-03-21 NOTE — PATIENT INSTRUCTIONS
Medicare Wellness Visit, Female     The best way to live healthy is to have a lifestyle where you eat a well-balanced diet, exercise regularly, limit alcohol use, and quit all forms of tobacco/nicotine, if applicable. Regular preventive services are another way to keep healthy. Preventive services (vaccines, screening tests, monitoring & exams) can help personalize your care plan, which helps you manage your own care. Screening tests can find health problems at the earliest stages, when they are easiest to treat. [facility] follows the current, evidence-based guidelines published by the [facility] ([facility]) when recommending preventive services for our patients. Because we follow these guidelines, sometimes recommendations change over time as research supports it. (For example, mammograms used to be recommended annually. Even though Medicare will still pay for an annual mammogram, the newer guidelines recommend a mammogram every two years for women of average risk). Of course, you and your doctor may decide to screen more often for some diseases, based on your risk and your co-morbidities (chronic disease you are already diagnosed with). Preventive services for you include:  - Medicare offers their members a free annual wellness visit, which is time for you and your primary care provider to discuss and plan for your preventive service needs.  Take advantage of this benefit every year!    -Over the age of 72 should receive the recommended pneumonia vaccines.    -All adults should have a flu vaccine yearly.  -All adults should have a tetanus vaccine every 10 years.   -Over the age 48 should receive the shingles vaccines.        -All adults should be screened once for Hepatitis C.  -All adults age 38-68 who are overweight should have a diabetes screening test once every three years.   -Other screening tests and preventive services for persons with diabetes include: an eye exam to screen for diabetic retinopathy, a kidney function test, a foot exam, and stricter control over your cholesterol.   -Cardiovascular screening for adults with routine risk involves an electrocardiogram (ECG) at intervals determined by your doctor.     -Colorectal cancer screenings should be done for adults age 39-70 with no increased risk factors for colorectal cancer. There are a number of acceptable methods of screening for this type of cancer. Each test has its own benefits and drawbacks. Discuss with your doctor what is most appropriate for you during your annual wellness visit. The different tests include: colonoscopy (considered the best screening method), a fecal occult blood test, a fecal DNA test, and sigmoidoscopy.    -Lung cancer screening is recommended annually with a low dose CT scan for adults between age 54 and 68, who have smoked at least 30 pack years (equivalent of 1 pack per day for 30 days), and who is a current smoker or quit less than 15 years ago.    -A bone mass density test is recommended when a woman turns 65 to screen for osteoporosis. This test is only recommended one time, as a screening. Some providers will use this same test as a disease monitoring tool if you already have osteoporosis. -Breast cancer screenings are recommended every other year for women of normal risk, age 54-69.    -Cervical cancer screenings for women over age 72 are only recommended with certain risk factors.      Here is a list of your current Health Maintenance items (your personalized list of preventive services) with a due date:  Health Maintenance Due   Topic Date Due    COVID-19 Vaccine (4 - Booster for "[employer] series) 01/13/2022    Cholesterol Test   03/16/2023    Hemoglobin A1C    03/22/2023

## 2023-03-21 NOTE — PROGRESS NOTES
Soren Shore is a 77 y.o. female    Chief Complaint   Patient presents with    Diabetes    Hypertension     1. Have you been to the ER, urgent care clinic since your last visit? Hospitalized since your last visit? No    2. Have you seen or consulted any other health care providers outside of the [address] [place] [address] since your last visit? Include any pap smears or colon screening. No  This is the Subsequent Medicare Annual Wellness Exam, performed 12 months or more after the Initial AWV or the last Subsequent AWV    I have reviewed the patient's medical history in detail and updated the computerized patient record. Assessment/Plan   Education and counseling provided:  Are appropriate based on today's review and evaluation    1. Medicare annual wellness visit, subsequent  2.  Screening for alcoholism     Depression Risk Factor Screening     3 most recent PHQ Screens 3/21/2023   PHQ Not Done -   Little interest or pleasure in doing things Not at all   Feeling down, depressed, irritable, or hopeless Not at all   Total Score PHQ 2 0   Trouble falling or staying asleep, or sleeping too much Not at all   Feeling tired or having little energy Not at all   Poor appetite, weight loss, or overeating Not at all   Feeling bad about yourself - or that you are a failure or have let yourself or your family down Not at all   Trouble concentrating on things such as school, work, reading, or watching TV Not at all   Moving or speaking so slowly that other people could have noticed; or the opposite being so fidgety that others notice Not at all   Thoughts of being better off dead, or hurting yourself in some way Not at all   PHQ 9 Score 0   How difficult have these problems made it for you to do your work, take care of your home and get along with others Not difficult at all       Alcohol & Drug Abuse Risk Screen    Do you average more than 1 drink per night or more than 7 drinks a week:  No    On any one occasion in the past three months have you have had more than 3 drinks containing alcohol:  No          Functional Ability and Level of Safety    Hearing: Hearing is good. Activities of Daily Living: The home contains: no safety equipment. Patient needs help with:  phone, transportation, shopping, preparing meals, laundry, housework, managing medications, managing money, eating, dressing, bathing, hygiene, bathroom needs, and walking      Ambulation: with mild difficulty     Fall Risk:  Fall Risk Assessment, last 12 mths 3/21/2023   Able to walk? Yes   Fall in past 12 months? 0   Do you feel unsteady? 0   Are you worried about falling 0   Is TUG test greater than 12 seconds? -   Is the gait abnormal? -   Number of falls in past 12 months -   Fall with injury? -      Abuse Screen:  Patient is not abused       Cognitive Screening    Has your family/caregiver stated any concerns about your memory: yes - sister     Cognitive Screening: Normal - Verbal Fluency Test    Health Maintenance Due     Health Maintenance Due   Topic Date Due    COVID-19 Vaccine (4 - Booster for [employer] series) 01/13/2022    Lipid Screen  03/16/2023    A1C test (Diabetic or Prediabetic)  03/22/2023       Patient Care Team   Patient Care Team:  Alli Bloom MD as PCP - General (Internal Medicine Physician)  Alli Bloom MD as PCP - REHABILITATION [facility] Empaneled Provider  Alli Bloom MD (Internal Medicine Physician)    History     Patient Active Problem List   Diagnosis Code    Hypercholesterolemia E78.00    Diabetes ([address] E11.9    Hypertension I10    Seizures ([address] R56.9    [place] XXXA    Fracture of rib of left side S22.32XA    Intellectual disability F79    Hemiparesis ([facility] G81.90    Thyroid nodule E04.1     Past Medical History:   Diagnosis Date    Colonoscopy refused 01/07/2016    Diabetes [facility])     Fall     Fracture of rib of left side 07/23/2015    Hemiparesis ([address] 1950    left    Hypercholesterolemia     Hypertension Intellectual disability     S/P colonoscopy 04/17/2003    n    Seizures ([facility])     Thyroid nodule 09/21/2022      Past Surgical History:   Procedure Laterality Date    HX BREAST BIOPSY Left 2018    needle benign     Current Outpatient Medications   Medication Sig Dispense Refill    telmisartan-hydroCHLOROthiazide (MICARDIS HCT) 40-12.5 mg per tablet Take 1 Tablet by mouth daily. 90 Tablet 3    phenytoin ER (DILANTIN ER) 100 mg ER capsule take 3 capsules by mouth ON ODD DAYS and 2 capsules ON EVEN DAYS 225 Capsule 3    metFORMIN (GLUCOPHAGE) 500 mg tablet take 1 tablet by mouth once daily with breakfast 90 Tablet 3    ergocalciferol (ERGOCALCIFEROL) 1,250 mcg (50,000 unit) capsule Take 1 Capsule by mouth every seven (7) days.  4 Capsule 4    lovastatin (MEVACOR) 40 mg tablet take 1 tablet by mouth once daily WITH A MEAL PATIENT NEEDS AN APPOINTMENT BEFORE REFILLS 90 Tablet 11     No Known Allergies    Family History   Problem Relation Age of Onset    Diabetes Mother     No Known Problems Father      Social History     Tobacco Use    Smoking status: Never    Smokeless tobacco: Never   Substance Use Topics    Alcohol use: No         Dominga Zhu MA

## 2023-03-21 NOTE — PROGRESS NOTES
SPORTS MEDICINE AND PRIMARY CARE  David Jackson MD, [address] [address]  Phone:  [phone]  Fax: [phone]      Chief Complaint   Patient presents with    Diabetes    Hypertension         SUBECTIVE:    Warden Jeffery is a 77 y.o. female Patient returns today with her sister without new complaints. She has a history of type 2 diabetes, thyroid nodule, left hemiparesis, seizure disorder, intellectual disability, primary hypertension, dyslipidemia and is seen for evaluation. She had a fine needle aspiration on 12/14 of the thyroid nodule with Afirma GSC benign, negative for malignancy. The specimen was consistent of Hurthle cells and follicular cells with some microfollicular architecture. Current Outpatient Medications   Medication Sig Dispense Refill    lovastatin (MEVACOR) 40 mg tablet Take 1 Tablet by mouth nightly. Indications: high cholesterol 90 Tablet 3    telmisartan-hydroCHLOROthiazide (MICARDIS HCT) 40-12.5 mg per tablet Take 1 Tablet by mouth daily. 90 Tablet 3    phenytoin ER (DILANTIN ER) 100 mg ER capsule take 3 capsules by mouth ON ODD DAYS and 2 capsules ON EVEN DAYS 225 Capsule 3    metFORMIN (GLUCOPHAGE) 500 mg tablet take 1 tablet by mouth once daily with breakfast 90 Tablet 3    ergocalciferol (ERGOCALCIFEROL) 1,250 mcg (50,000 unit) capsule Take 1 Capsule by mouth every seven (7) days. 4 Capsule 4     Past Medical History:   Diagnosis Date    Colonoscopy refused 01/07/2016    Diabetes [facility])     Fall     Fracture of rib of left side 07/23/2015    Hemiparesis ([facility] 1950    left    Hypercholesterolemia     Hypertension     Intellectual disability     S/P colonoscopy 04/17/2003    n    Seizures ([facility]     Thyroid nodule 09/21/2022     Past Surgical History:   Procedure Laterality Date    HX BREAST BIOPSY Left 2018    needle benign     No Known Allergies    REVIEW OF SYSTEMS:   No falls.         Social History     Socioeconomic History    Marital status: SINGLE   Tobacco Use    Smoking status: Never    Smokeless tobacco: Never   Vaping Use    Vaping Use: Never used   Substance and Sexual Activity    Alcohol use: No    Drug use: No    Sexual activity: Not Currently   Social History Narrative    ** Merged History Encounter **     Medical History: DM2 non-insulin req. 1997primary HTN 1992Dyslipidemia 1992Mental retardation 1950Seizure d/o Left hemiparesis    Obesity     Gyn History: Last mammogram date 2013. Last menstrual perioddate . OB History: Total pregnancies 0. Surgical History: colonoscopy     Hospitalization/Major Diagnostic Procedure: seizure d/o     Family History: Mother: , DM complications, HTN, ASHDFather:  79 yrsChildren: [place] brother(s) , 4 sister(s) . Social History: Alcohol Use Patient does not use alcohol. Smoking Status Patient is a never smoker. Marital Status: Single. Lives w ith:    sister(s). Occupation/W ork: disabled. Education/School: high school. Sabianist: no.   r  Family History   Problem Relation Age of Onset    Diabetes Mother     No Known Problems Father        OBJECTIVE:  Visit Vitals  /68 (BP 1 Location: Left upper arm, BP Patient Position: Sitting)   Pulse 87   Temp 98.5 °F (36.9 °C) (Oral)   Resp 18   Ht 5' 1\" (1.549 m)   Wt 151 lb 11.2 oz (68.8 kg)   SpO2 99%   BMI 28.66 kg/m²     ENT: perrla,  eom intact  NECK: supple. Thyroid normal  CHEST: clear to ascultation and percussion   HEART: regular rate and rhythm  ABD: soft, bowel sounds active  EXTREMITIES: no edema, pulse 1+     No visits with results within 3 Month(s) from this visit. Latest known visit with results is:   Orders Only on 2022   Component Date Value Ref Range Status    SAMPLES BEING HELD 2022 1SST   Final    COMMENT 2022 Add-on orders for these samples will be processed based on acceptable specimen integrity and analyte stability, which may vary by analyte.     Final ASSESSMENT:  1. Medicare annual wellness visit, subsequent    2. Screening for alcoholism    3. Type 2 diabetes mellitus without complication, without long-term current use of insulin ([facility]    4. Thyroid nodule    5. Hemiparesis of left nondominant side, unspecified hemiparesis etiology ([facility]    6. Seizures ([facility]    7. Intellectual disability    8. Primary hypertension    9. Hypercholesterolemia    10. Encounter for screening mammogram for malignant neoplasm of breast       We will assess blood sugar control with hemoglobin A1c and report to her the results in a letter to her home. The thyroid nodule on fine needle aspiration fortunately was negative for cancer. No changes in the left hemiparesis. Fortunately, no seizures since we last saw her. Blood pressure control is at goal.    We will assess dyslipidemia with appropriate laboratory studies. Care gaps are reviewed. She will be back to see us in four to six months, sooner if needed. I have discussed the diagnosis with the patient and the intended plan as seen in the  orders above. The patient understands and agees with the plan. The patient has   received an after visit summary and questions were answered concerning  future plans  Patient labs and/or xrays were reviewed  Past records were reviewed. PLAN:  .  Orders Placed This Encounter    [facility] MAMMO BI SCREENING INCL CAD    CBC WITH AUTOMATED DIFF    METABOLIC PANEL, COMPREHENSIVE    LIPID PANEL    TSH 3RD GENERATION    HEMOGLOBIN A1C WITH EAG    MICROALBUMIN, UR, RAND W/ MICROALB/CREAT RATIO    URINALYSIS W/ REFLEX CULTURE    PHENYTOIN    lovastatin (MEVACOR) 40 mg tablet                     ATTENTION:   This medical record was transcribed using an electronic medical records system. Although proofread, it may and can contain electronic and spelling errors. Other human spelling and other errors may be present. Corrections may be executed at a later time.   Please feel free to contact us for any clarifications as needed.

## 2023-03-22 LAB
ALBUMIN SERPL-MCNC: 3.6 G/DL (ref 3.5–5)
ALBUMIN/GLOB SERPL: 0.9 (ref 1.1–2.2)
ALP SERPL-CCNC: 70 U/L (ref 45–117)
ALT SERPL-CCNC: 20 U/L (ref 12–78)
ANION GAP SERPL CALC-SCNC: 5 MMOL/L (ref 5–15)
APPEARANCE UR: CLEAR
AST SERPL-CCNC: 11 U/L (ref 15–37)
BACTERIA URNS QL MICRO: NEGATIVE /HPF
BASOPHILS # BLD: 0 K/UL (ref 0–0.1)
BASOPHILS NFR BLD: 0 % (ref 0–1)
BILIRUB SERPL-MCNC: 0.2 MG/DL (ref 0.2–1)
BILIRUB UR QL: NEGATIVE
BUN SERPL-MCNC: 15 MG/DL (ref 6–20)
BUN/CREAT SERPL: 21 (ref 12–20)
CALCIUM SERPL-MCNC: 8.8 MG/DL (ref 8.5–10.1)
CHLORIDE SERPL-SCNC: 103 MMOL/L (ref 97–108)
CHOLEST SERPL-MCNC: 173 MG/DL
CO2 SERPL-SCNC: 32 MMOL/L (ref 21–32)
COLOR UR: ABNORMAL
CREAT SERPL-MCNC: 0.71 MG/DL (ref 0.55–1.02)
CREAT UR-MCNC: 70.6 MG/DL
DIFFERENTIAL METHOD BLD: NORMAL
EOSINOPHIL # BLD: 0.3 K/UL (ref 0–0.4)
EOSINOPHIL NFR BLD: 5 % (ref 0–7)
EPITH CASTS URNS QL MICRO: ABNORMAL /LPF
ERYTHROCYTE [DISTWIDTH] IN BLOOD BY AUTOMATED COUNT: 12.5 % (ref 11.5–14.5)
EST. AVERAGE GLUCOSE BLD GHB EST-MCNC: 108 MG/DL
GLOBULIN SER CALC-MCNC: 3.9 G/DL (ref 2–4)
GLUCOSE SERPL-MCNC: 80 MG/DL (ref 65–100)
GLUCOSE UR STRIP.AUTO-MCNC: NEGATIVE MG/DL
HBA1C MFR BLD: 5.4 % (ref 4–5.6)
HCT VFR BLD AUTO: 38.1 % (ref 35–47)
HDLC SERPL-MCNC: 89 MG/DL
HDLC SERPL: 1.9 (ref 0–5)
HGB BLD-MCNC: 11.7 G/DL (ref 11.5–16)
HGB UR QL STRIP: NEGATIVE
HYALINE CASTS URNS QL MICRO: ABNORMAL /LPF (ref 0–5)
IMM GRANULOCYTES # BLD AUTO: 0 K/UL (ref 0–0.04)
IMM GRANULOCYTES NFR BLD AUTO: 0 % (ref 0–0.5)
KETONES UR QL STRIP.AUTO: NEGATIVE MG/DL
LDLC SERPL CALC-MCNC: 76.8 MG/DL (ref 0–100)
LEUKOCYTE ESTERASE UR QL STRIP.AUTO: ABNORMAL
LYMPHOCYTES # BLD: 1.9 K/UL (ref 0.8–3.5)
LYMPHOCYTES NFR BLD: 33 % (ref 12–49)
MCH RBC QN AUTO: 30.2 PG (ref 26–34)
MCHC RBC AUTO-ENTMCNC: 30.7 G/DL (ref 30–36.5)
MCV RBC AUTO: 98.2 FL (ref 80–99)
MICROALBUMIN UR-MCNC: 0.6 MG/DL
MICROALBUMIN/CREAT UR-RTO: 8 MG/G (ref 0–30)
MONOCYTES # BLD: 0.4 K/UL (ref 0–1)
MONOCYTES NFR BLD: 8 % (ref 5–13)
NEUTS SEG # BLD: 3.1 K/UL (ref 1.8–8)
NEUTS SEG NFR BLD: 54 % (ref 32–75)
NITRITE UR QL STRIP.AUTO: NEGATIVE
NRBC # BLD: 0 K/UL (ref 0–0.01)
NRBC BLD-RTO: 0 PER 100 WBC
PH UR STRIP: 6.5 (ref 5–8)
PHENYTOIN SERPL-MCNC: 12.7 UG/ML (ref 10–20)
PLATELET # BLD AUTO: 239 K/UL (ref 150–400)
PMV BLD AUTO: 9.4 FL (ref 8.9–12.9)
POTASSIUM SERPL-SCNC: 4.3 MMOL/L (ref 3.5–5.1)
PROT SERPL-MCNC: 7.5 G/DL (ref 6.4–8.2)
PROT UR STRIP-MCNC: NEGATIVE MG/DL
RBC # BLD AUTO: 3.88 M/UL (ref 3.8–5.2)
RBC #/AREA URNS HPF: ABNORMAL /HPF (ref 0–5)
SODIUM SERPL-SCNC: 140 MMOL/L (ref 136–145)
SP GR UR REFRACTOMETRY: 1.01 (ref 1–1.03)
TRIGL SERPL-MCNC: 36 MG/DL (ref ?–150)
TSH SERPL DL<=0.05 MIU/L-ACNC: 0.67 UIU/ML (ref 0.36–3.74)
UA: UC IF INDICATED,UAUC: ABNORMAL
UROBILINOGEN UR QL STRIP.AUTO: 1 EU/DL (ref 0.2–1)
VLDLC SERPL CALC-MCNC: 7.2 MG/DL
WBC # BLD AUTO: 5.6 K/UL (ref 3.6–11)
WBC URNS QL MICRO: ABNORMAL /HPF (ref 0–4)

## 2023-04-23 DIAGNOSIS — Z12.31 ENCOUNTER FOR SCREENING MAMMOGRAM FOR MALIGNANT NEOPLASM OF BREAST: ICD-10-CM

## 2023-04-23 DIAGNOSIS — E11.9 TYPE 2 DIABETES MELLITUS WITHOUT COMPLICATION, WITHOUT LONG-TERM CURRENT USE OF INSULIN (HCC): Primary | ICD-10-CM

## 2023-05-03 ENCOUNTER — HOSPITAL ENCOUNTER (OUTPATIENT)
Dept: MAMMOGRAPHY | Age: 78
Discharge: HOME | End: 2023-05-03
Attending: INTERNAL MEDICINE
Payer: MEDICARE

## 2023-05-03 DIAGNOSIS — E11.9 TYPE 2 DIABETES MELLITUS WITHOUT COMPLICATION, WITHOUT LONG-TERM CURRENT USE OF INSULIN (HCC): ICD-10-CM

## 2023-05-03 DIAGNOSIS — Z12.31 ENCOUNTER FOR SCREENING MAMMOGRAM FOR MALIGNANT NEOPLASM OF BREAST: ICD-10-CM

## 2023-05-03 PROCEDURE — 77067 SCR MAMMO BI INCL CAD: CPT

## 2023-08-29 RX ORDER — PHENYTOIN SODIUM 100 MG/1
CAPSULE, EXTENDED RELEASE ORAL
Qty: 225 CAPSULE | Refills: 0 | Status: SHIPPED | OUTPATIENT
Start: 2023-08-29

## 2023-09-20 ENCOUNTER — OFFICE VISIT (OUTPATIENT)
Facility: CLINIC | Age: 78
End: 2023-09-20
Payer: MEDICARE

## 2023-09-20 VITALS
BODY MASS INDEX: 27.9 KG/M2 | OXYGEN SATURATION: 100 % | SYSTOLIC BLOOD PRESSURE: 128 MMHG | HEART RATE: 85 BPM | RESPIRATION RATE: 20 BRPM | HEIGHT: 61 IN | TEMPERATURE: 97.8 F | DIASTOLIC BLOOD PRESSURE: 56 MMHG | WEIGHT: 147.8 LBS

## 2023-09-20 DIAGNOSIS — R56.9 SEIZURES (HCC): ICD-10-CM

## 2023-09-20 DIAGNOSIS — Z11.59 ENCOUNTER FOR SCREENING FOR OTHER VIRAL DISEASES: ICD-10-CM

## 2023-09-20 DIAGNOSIS — E55.9 VITAMIN D DEFICIENCY: ICD-10-CM

## 2023-09-20 DIAGNOSIS — F79 INTELLECTUAL DISABILITY: ICD-10-CM

## 2023-09-20 DIAGNOSIS — I10 PRIMARY HYPERTENSION: ICD-10-CM

## 2023-09-20 DIAGNOSIS — E11.9 TYPE 2 DIABETES MELLITUS WITHOUT COMPLICATION, WITHOUT LONG-TERM CURRENT USE OF INSULIN (HCC): Primary | ICD-10-CM

## 2023-09-20 DIAGNOSIS — E04.1 THYROID NODULE: ICD-10-CM

## 2023-09-20 DIAGNOSIS — I69.959 HEMIPARESIS AS LATE EFFECT OF CEREBROVASCULAR DISEASE, UNSPECIFIED CEREBROVASCULAR DISEASE TYPE, UNSPECIFIED LATERALITY (HCC): ICD-10-CM

## 2023-09-20 DIAGNOSIS — E78.00 HYPERCHOLESTEROLEMIA: ICD-10-CM

## 2023-09-20 PROCEDURE — 3078F DIAST BP <80 MM HG: CPT | Performed by: INTERNAL MEDICINE

## 2023-09-20 PROCEDURE — 3044F HG A1C LEVEL LT 7.0%: CPT | Performed by: INTERNAL MEDICINE

## 2023-09-20 PROCEDURE — 3074F SYST BP LT 130 MM HG: CPT | Performed by: INTERNAL MEDICINE

## 2023-09-20 PROCEDURE — 1123F ACP DISCUSS/DSCN MKR DOCD: CPT | Performed by: INTERNAL MEDICINE

## 2023-09-20 PROCEDURE — 99214 OFFICE O/P EST MOD 30 MIN: CPT | Performed by: INTERNAL MEDICINE

## 2023-09-20 RX ORDER — TELMISARTAN AND HYDROCHLORTHIAZIDE 40; 12.5 MG/1; MG/1
1 TABLET ORAL DAILY
Qty: 90 TABLET | Refills: 11 | Status: SHIPPED | OUTPATIENT
Start: 2023-09-20

## 2023-09-20 SDOH — ECONOMIC STABILITY: FOOD INSECURITY: WITHIN THE PAST 12 MONTHS, THE FOOD YOU BOUGHT JUST DIDN'T LAST AND YOU DIDN'T HAVE MONEY TO GET MORE.: NEVER TRUE

## 2023-09-20 SDOH — ECONOMIC STABILITY: FOOD INSECURITY: WITHIN THE PAST 12 MONTHS, YOU WORRIED THAT YOUR FOOD WOULD RUN OUT BEFORE YOU GOT MONEY TO BUY MORE.: NEVER TRUE

## 2023-09-20 SDOH — ECONOMIC STABILITY: INCOME INSECURITY: HOW HARD IS IT FOR YOU TO PAY FOR THE VERY BASICS LIKE FOOD, HOUSING, MEDICAL CARE, AND HEATING?: NOT HARD AT ALL

## 2023-09-20 SDOH — ECONOMIC STABILITY: HOUSING INSECURITY
IN THE LAST 12 MONTHS, WAS THERE A TIME WHEN YOU DID NOT HAVE A STEADY PLACE TO SLEEP OR SLEPT IN A SHELTER (INCLUDING NOW)?: NO

## 2023-09-20 ASSESSMENT — ANXIETY QUESTIONNAIRES
4. TROUBLE RELAXING: 0
5. BEING SO RESTLESS THAT IT IS HARD TO SIT STILL: 0
2. NOT BEING ABLE TO STOP OR CONTROL WORRYING: 0
6. BECOMING EASILY ANNOYED OR IRRITABLE: 0
3. WORRYING TOO MUCH ABOUT DIFFERENT THINGS: 0
IF YOU CHECKED OFF ANY PROBLEMS ON THIS QUESTIONNAIRE, HOW DIFFICULT HAVE THESE PROBLEMS MADE IT FOR YOU TO DO YOUR WORK, TAKE CARE OF THINGS AT HOME, OR GET ALONG WITH OTHER PEOPLE: NOT DIFFICULT AT ALL
7. FEELING AFRAID AS IF SOMETHING AWFUL MIGHT HAPPEN: 0
GAD7 TOTAL SCORE: 0
1. FEELING NERVOUS, ANXIOUS, OR ON EDGE: 0

## 2023-09-20 ASSESSMENT — PATIENT HEALTH QUESTIONNAIRE - PHQ9
1. LITTLE INTEREST OR PLEASURE IN DOING THINGS: 0
SUM OF ALL RESPONSES TO PHQ QUESTIONS 1-9: 0
2. FEELING DOWN, DEPRESSED OR HOPELESS: 0
SUM OF ALL RESPONSES TO PHQ9 QUESTIONS 1 & 2: 0
SUM OF ALL RESPONSES TO PHQ QUESTIONS 1-9: 0

## 2023-09-20 NOTE — PROGRESS NOTES
SPORTS MEDICINE AND PRIMARY CARE  Kristen Tan MD, [place], [address] [place] [address]  Phone:  [phone]  Fax: [phone]       Chief Complaint   Patient presents with    6 Month Follow-Up   . SUBJECTIVE:    Gene Tang is a 77 y.o. female Patient returns today with her sister with a known history of diabetes mellitus type 2, hemiparesis, dyslipidemia, hypertension, intellectual disability, seizure disorder and thyroid nodule and is seen for evaluation. There are no new complaints and patient is seen for evaluation. Current Outpatient Medications   Medication Sig Dispense Refill    telmisartan-hydroCHLOROthiazide (MICARDIS HCT) 40-12.5 MG per tablet Take 1 tablet by mouth daily 90 tablet 11    phenytoin (DILANTIN) 100 MG ER capsule take 3 capsules by mouth ON ODD DAYS and 2 capsules ON EVEN DAYS 225 capsule 0    metFORMIN (GLUCOPHAGE) 500 MG tablet take 1 tablet by mouth once daily with breakfast 90 tablet 3    ergocalciferol (ERGOCALCIFEROL) 1.25 MG (55191 UT) capsule Take 1 capsule by mouth every 7 days      lovastatin (MEVACOR) 40 MG tablet Take 1 tablet by mouth       No current facility-administered medications for this visit.      Past Medical History:   Diagnosis Date    Colonoscopy refused 01/07/2016    Diabetes [facility])     Fall     Fracture of rib of left side 07/23/2015    Hemiparesis ([address]) 1951    left    Hypercholesterolemia     Hypertension     Intellectual disability     S/P colonoscopy 04/17/2003    n    Seizures ([address])     Thyroid nodule 09/21/2022     Past Surgical History:   Procedure Laterality Date    BREAST BIOPSY Left 2018    needle benign    SERGEY STEROTACTIC LOC BREAST BIOPSY LEFT Left 8/22/2018    SERGEY STEROTACTIC LOC BREAST BIOPSY LEFT 8/22/2018 [facility] RAD MAMMO     No Known Allergies      REVIEW OF SYSTEMS:  General: negative for - chills or fever  ENT: negative for - headaches, nasal congestion or tinnitus  Respiratory: negative for - cough, hemoptysis,

## 2023-09-20 NOTE — PROGRESS NOTES
Tim Meraz is a 77 y.o. female    Chief Complaint   Patient presents with    6 Month Follow-Up     1. Have you been to the ER, urgent care clinic since your last visit? Hospitalized since your last visit? No    2. Have you seen or consulted any other health care providers outside of the [address] [place] [address] Avenue since your last visit? Include any pap smears or colon screening.  No

## 2023-09-21 LAB
25(OH)D3 SERPL-MCNC: <9 NG/ML (ref 30–100)
EST. AVERAGE GLUCOSE BLD GHB EST-MCNC: 103 MG/DL
HBA1C MFR BLD: 5.2 % (ref 4–5.6)
HBV SURFACE AB SER QL: NONREACTIVE
HBV SURFACE AB SER-ACNC: <3.1 MIU/ML
HBV SURFACE AG SER QL: <0.1 INDEX
HBV SURFACE AG SER QL: NEGATIVE
HCV AB SER IA-ACNC: 0.08 INDEX
HCV AB SERPL QL IA: NONREACTIVE
PHENYTOIN SERPL-MCNC: 14.4 UG/ML (ref 10–20)

## 2023-09-21 RX ORDER — MELATONIN
2000 DAILY
Qty: 180 TABLET | Refills: 3 | Status: SHIPPED | OUTPATIENT
Start: 2023-09-21

## 2023-09-22 LAB — HBV CORE AB SERPL QL IA: NEGATIVE

## 2024-02-20 RX ORDER — PHENYTOIN SODIUM 100 MG/1
CAPSULE, EXTENDED RELEASE ORAL
Qty: 225 CAPSULE | Refills: 0 | Status: SHIPPED | OUTPATIENT
Start: 2024-02-20

## 2024-04-10 RX ORDER — LOVASTATIN 40 MG/1
TABLET ORAL
Qty: 90 TABLET | Refills: 3 | Status: SHIPPED | OUTPATIENT
Start: 2024-04-10

## 2024-05-01 ENCOUNTER — OFFICE VISIT (OUTPATIENT)
Facility: CLINIC | Age: 79
End: 2024-05-01

## 2024-05-01 VITALS
TEMPERATURE: 97.8 F | HEIGHT: 61 IN | WEIGHT: 143.9 LBS | BODY MASS INDEX: 27.17 KG/M2 | HEART RATE: 89 BPM | RESPIRATION RATE: 18 BRPM | SYSTOLIC BLOOD PRESSURE: 128 MMHG | OXYGEN SATURATION: 98 % | DIASTOLIC BLOOD PRESSURE: 71 MMHG

## 2024-05-01 DIAGNOSIS — E11.9 TYPE 2 DIABETES MELLITUS WITHOUT COMPLICATION, WITHOUT LONG-TERM CURRENT USE OF INSULIN (HCC): ICD-10-CM

## 2024-05-01 DIAGNOSIS — Z12.31 ENCOUNTER FOR SCREENING MAMMOGRAM FOR MALIGNANT NEOPLASM OF BREAST: ICD-10-CM

## 2024-05-01 DIAGNOSIS — I69.959 HEMIPARESIS AS LATE EFFECT OF CEREBROVASCULAR DISEASE, UNSPECIFIED CEREBROVASCULAR DISEASE TYPE, UNSPECIFIED LATERALITY (HCC): ICD-10-CM

## 2024-05-01 DIAGNOSIS — Z00.00 MEDICARE ANNUAL WELLNESS VISIT, SUBSEQUENT: Primary | ICD-10-CM

## 2024-05-01 DIAGNOSIS — R56.9 SEIZURES (HCC): ICD-10-CM

## 2024-05-01 DIAGNOSIS — E78.00 HYPERCHOLESTEROLEMIA: ICD-10-CM

## 2024-05-01 DIAGNOSIS — I10 PRIMARY HYPERTENSION: ICD-10-CM

## 2024-05-01 DIAGNOSIS — F79 INTELLECTUAL DISABILITY: ICD-10-CM

## 2024-05-01 RX ORDER — MELATONIN
2000 DAILY
Qty: 180 TABLET | Refills: 3 | Status: SHIPPED | OUTPATIENT
Start: 2024-05-01

## 2024-05-01 ASSESSMENT — LIFESTYLE VARIABLES
HOW MANY STANDARD DRINKS CONTAINING ALCOHOL DO YOU HAVE ON A TYPICAL DAY: PATIENT DOES NOT DRINK
HOW OFTEN DO YOU HAVE A DRINK CONTAINING ALCOHOL: NEVER

## 2024-05-01 ASSESSMENT — PATIENT HEALTH QUESTIONNAIRE - PHQ9
SUM OF ALL RESPONSES TO PHQ QUESTIONS 1-9: 0
SUM OF ALL RESPONSES TO PHQ9 QUESTIONS 1 & 2: 0
2. FEELING DOWN, DEPRESSED OR HOPELESS: NOT AT ALL
1. LITTLE INTEREST OR PLEASURE IN DOING THINGS: NOT AT ALL
SUM OF ALL RESPONSES TO PHQ QUESTIONS 1-9: 0

## 2024-05-01 NOTE — PROGRESS NOTES
Medicare Annual Wellness Visit    Cassidy Jeong is here for Medicare AWV    Assessment & Plan   Medicare annual wellness visit, subsequent  Recommendations for Preventive Services Due: see orders and patient instructions/AVS.  Recommended screening schedule for the next 5-10 years is provided to the patient in written form: see Patient Instructions/AVS.     No follow-ups on file.     Subjective       Patient's complete Health Risk Assessment and screening values have been reviewed and are found in Flowsheets. The following problems were reviewed today and where indicated follow up appointments were made and/or referrals ordered.    Positive Risk Factor Screenings with Interventions:    Fall Risk:  Do you feel unsteady or are you worried about falling? : (!) yes  2 or more falls in past year?: no  Fall with injury in past year?: no     Interventions:    Reviewed medications, home hazards, visual acuity, and co-morbidities that can increase risk for falls  See A/P for plan and any pertinent orders    Cognitive:   Clock Drawing Test (CDT): (!) Abnormal  Words recalled: 0 Words Recalled  Total Score: (!) 0  Total Score Interpretation: Abnormal Mini-Cog  Interventions:  See A/P for plan and any pertinent orders            Activity, Diet, and Weight:  On average, how many days per week do you engage in moderate to strenuous exercise (like a brisk walk)?: 0 days  On average, how many minutes do you engage in exercise at this level?: 0 min    Do you eat balanced/healthy meals regularly?: Yes    Body mass index is 27.19 kg/m².      Inactivity Interventions:  See A/P for plan and any pertinent orders        Dentist Screen:  Have you seen the dentist within the past year?: (!) No    Intervention:  See A/P for any pertinent orders       ADL's:   Patient reports needing help with:  Select all that apply: (!) Dressing  Interventions:  See A/P for plan and any pertinent orders    Advanced Directives:  Do you have a Living

## 2024-05-01 NOTE — PROGRESS NOTES
SPORTS MEDICINE AND PRIMARY CARE  Judson Uriostegui MD, FACP, CMD  [address] [facility] [address]  Phone:  [phone]  Fax: [phone]       Chief Complaint   Patient presents with    Medicare AW   .      SUBJECTIVE:    Cassidy Jeong is a 79 y.o. female Patient returns today with her sister with a known history of diabetes mellitus type 2, seizure disorder, hemiparesis, thyroid nodule, intellectual disability, hypertension, dyslipidemia, fall risk and is seen for evaluation.    Patient has had no falls, has had no seizures, and is seen for evaluation. She reminds me that today \"I'm late\".              Current Outpatient Medications   Medication Sig Dispense Refill    vitamin D3 (CHOLECALCIFEROL) 25 MCG (1000 UT) TABS tablet Take 2 tablets by mouth daily 180 tablet 3    metFORMIN (GLUCOPHAGE) 500 MG tablet Take 1 tablet by mouth daily (with breakfast) 90 tablet 3    lovastatin (MEVACOR) 40 MG tablet take 1 tablet by mouth nightly for HIGH cholesterol 90 tablet 3    phenytoin (DILANTIN) 100 MG ER capsule take 3 capsules by mouth ON ODD DAYS and 2 capsules ON EVEN DAYS 225 capsule 0    telmisartan-hydroCHLOROthiazide (MICARDIS HCT) 40-12.5 MG per tablet Take 1 tablet by mouth daily 90 tablet 11    ergocalciferol (ERGOCALCIFEROL) 1.25 MG (33911 UT) capsule Take 1 capsule by mouth every 7 days       No current facility-administered medications for this visit.     Past Medical History:   Diagnosis Date    Colonoscopy refused 01/07/2016    Diabetes (HCC)     Fall     Fracture of rib of left side 07/23/2015    Hemiparesis (HCC) 1950    left    Hypercholesterolemia     Hypertension     Intellectual disability     S/P colonoscopy 04/17/2003    n    Seizures (HCC)     Thyroid nodule 09/21/2022     Past Surgical History:   Procedure Laterality Date    BREAST BIOPSY Left 2018    needle benign    SERGEY STEROTACTIC LOC BREAST BIOPSY LEFT Left 8/22/2018    SERGEY STEROTACTIC LOC BREAST BIOPSY LEFT 8/22/2018 [facility] RAD MAMMO

## 2024-05-01 NOTE — PATIENT INSTRUCTIONS
help. What's holding you back?  Getting started.  Have a goal, but break it into easy tasks. Small steps build into big accomplishments.  Staying motivated.  If you feel like skipping your activity, remember your goal. Maybe you want to move better and stay independent. Every activity gets you one step closer.  Not feeling your best.  Start with 5 minutes of an activity you enjoy. Prove to yourself you can do it. As you get comfortable, increase your time.  You may not be where you want to be. But you're in the process of getting there. Everyone starts somewhere.  How can you find safe ways to stay active?  Talk with your doctor about any physical challenges you're facing. Make a plan with your doctor if you have a health problem or aren't sure how to get started with activity.  If you're already active, ask your doctor if there is anything you should change to stay safe as your body and health change.  If you tend to feel dizzy after you take medicine, avoid activity at that time. Try being active before you take your medicine. This will reduce your risk of falls.  If you plan to be active at home, make sure to clear your space before you get started. Remove things like TV cords, coffee tables, and throw rugs. It's safest to have plenty of space to move freely.  The key to getting more active is to take it slow and steady. Try to improve only a little bit at a time. Pick just one area to improve on at first. And if an activity hurts, stop and talk to your doctor.  Where can you learn more?  Go to https://www.[employer].net/patientEd and enter P600 to learn more about \"Learning About Being Active as an Older Adult.\"  Current as of: June 5, 2023               Content Version: 14.0  © 6071-3246 [employer].   Care instructions adapted under license by [employer]. If you have questions about a medical condition or this instruction, always ask your healthcare professional. [employer]

## 2024-05-01 NOTE — PROGRESS NOTES
Chief Complaint   Patient presents with    Medicare AWV     \"Have you been to the ER, urgent care clinic since your last visit?  Hospitalized since your last visit?\"    NO    “Have you seen or consulted any other health care providers outside of [facility] since your last visit?”    NO            Click Here for Release of Records Request

## 2024-05-02 LAB
ALBUMIN SERPL-MCNC: 3.5 G/DL (ref 3.5–5)
ALBUMIN/GLOB SERPL: 0.9 (ref 1.1–2.2)
ALP SERPL-CCNC: 65 U/L (ref 45–117)
ALT SERPL-CCNC: 21 U/L (ref 12–78)
ANION GAP SERPL CALC-SCNC: 6 MMOL/L (ref 5–15)
APPEARANCE UR: CLEAR
AST SERPL-CCNC: 15 U/L (ref 15–37)
BACTERIA URNS QL MICRO: ABNORMAL /HPF
BASOPHILS # BLD: 0 K/UL (ref 0–0.1)
BASOPHILS NFR BLD: 0 % (ref 0–1)
BILIRUB SERPL-MCNC: 0.2 MG/DL (ref 0.2–1)
BILIRUB UR QL: NEGATIVE
BUN SERPL-MCNC: 30 MG/DL (ref 6–20)
BUN/CREAT SERPL: 35 (ref 12–20)
CALCIUM SERPL-MCNC: 9.2 MG/DL (ref 8.5–10.1)
CHLORIDE SERPL-SCNC: 104 MMOL/L (ref 97–108)
CHOLEST SERPL-MCNC: 182 MG/DL
CO2 SERPL-SCNC: 32 MMOL/L (ref 21–32)
COLOR UR: ABNORMAL
CREAT SERPL-MCNC: 0.85 MG/DL (ref 0.55–1.02)
CREAT UR-MCNC: 84.4 MG/DL
DIFFERENTIAL METHOD BLD: NORMAL
EOSINOPHIL # BLD: 0.2 K/UL (ref 0–0.4)
EOSINOPHIL NFR BLD: 3 % (ref 0–7)
EPITH CASTS URNS QL MICRO: ABNORMAL /LPF
ERYTHROCYTE [DISTWIDTH] IN BLOOD BY AUTOMATED COUNT: 12.9 % (ref 11.5–14.5)
EST. AVERAGE GLUCOSE BLD GHB EST-MCNC: 103 MG/DL
GLOBULIN SER CALC-MCNC: 3.8 G/DL (ref 2–4)
GLUCOSE SERPL-MCNC: 76 MG/DL (ref 65–100)
GLUCOSE UR STRIP.AUTO-MCNC: NEGATIVE MG/DL
HBA1C MFR BLD: 5.2 % (ref 4–5.6)
HCT VFR BLD AUTO: 36.2 % (ref 35–47)
HDLC SERPL-MCNC: 84 MG/DL
HDLC SERPL: 2.2 (ref 0–5)
HGB BLD-MCNC: 11.9 G/DL (ref 11.5–16)
HGB UR QL STRIP: NEGATIVE
HYALINE CASTS URNS QL MICRO: ABNORMAL /LPF (ref 0–5)
IMM GRANULOCYTES # BLD AUTO: 0 K/UL (ref 0–0.04)
IMM GRANULOCYTES NFR BLD AUTO: 0 % (ref 0–0.5)
KETONES UR QL STRIP.AUTO: NEGATIVE MG/DL
LDLC SERPL CALC-MCNC: 91.2 MG/DL (ref 0–100)
LEUKOCYTE ESTERASE UR QL STRIP.AUTO: ABNORMAL
LYMPHOCYTES # BLD: 2.2 K/UL (ref 0.8–3.5)
LYMPHOCYTES NFR BLD: 36 % (ref 12–49)
MCH RBC QN AUTO: 31.2 PG (ref 26–34)
MCHC RBC AUTO-ENTMCNC: 32.9 G/DL (ref 30–36.5)
MCV RBC AUTO: 95 FL (ref 80–99)
MICROALBUMIN UR-MCNC: 0.57 MG/DL
MICROALBUMIN/CREAT UR-RTO: 7 MG/G (ref 0–30)
MONOCYTES # BLD: 0.5 K/UL (ref 0–1)
MONOCYTES NFR BLD: 8 % (ref 5–13)
NEUTS SEG # BLD: 3.1 K/UL (ref 1.8–8)
NEUTS SEG NFR BLD: 53 % (ref 32–75)
NITRITE UR QL STRIP.AUTO: NEGATIVE
NRBC # BLD: 0 K/UL (ref 0–0.01)
NRBC BLD-RTO: 0 PER 100 WBC
PH UR STRIP: 5.5 (ref 5–8)
PHENYTOIN SERPL-MCNC: 12.6 UG/ML (ref 10–20)
PLATELET # BLD AUTO: 251 K/UL (ref 150–400)
PMV BLD AUTO: 9.3 FL (ref 8.9–12.9)
POTASSIUM SERPL-SCNC: 4 MMOL/L (ref 3.5–5.1)
PROT SERPL-MCNC: 7.3 G/DL (ref 6.4–8.2)
PROT UR STRIP-MCNC: NEGATIVE MG/DL
RBC # BLD AUTO: 3.81 M/UL (ref 3.8–5.2)
RBC #/AREA URNS HPF: ABNORMAL /HPF (ref 0–5)
SODIUM SERPL-SCNC: 142 MMOL/L (ref 136–145)
SP GR UR REFRACTOMETRY: 1.02 (ref 1–1.03)
TRIGL SERPL-MCNC: 34 MG/DL
TSH SERPL DL<=0.05 MIU/L-ACNC: 0.5 UIU/ML (ref 0.36–3.74)
UROBILINOGEN UR QL STRIP.AUTO: 0.2 EU/DL (ref 0.2–1)
VLDLC SERPL CALC-MCNC: 6.8 MG/DL
WBC # BLD AUTO: 6 K/UL (ref 3.6–11)
WBC URNS QL MICRO: ABNORMAL /HPF (ref 0–4)

## 2024-06-24 RX ORDER — TELMISARTAN AND HYDROCHLORTHIAZIDE 40; 12.5 MG/1; MG/1
1 TABLET ORAL DAILY
Qty: 90 TABLET | Refills: 3 | Status: SHIPPED | OUTPATIENT
Start: 2024-06-24

## 2024-08-21 ENCOUNTER — HOSPITAL ENCOUNTER (OUTPATIENT)
Facility: HOSPITAL | Age: 79
Discharge: HOME | End: 2024-08-24
Attending: INTERNAL MEDICINE
Payer: MEDICARE

## 2024-08-21 VITALS — WEIGHT: 145 LBS | HEIGHT: 66 IN | BODY MASS INDEX: 23.3 KG/M2

## 2024-08-21 DIAGNOSIS — E11.9 TYPE 2 DIABETES MELLITUS WITHOUT COMPLICATION, WITHOUT LONG-TERM CURRENT USE OF INSULIN (HCC): ICD-10-CM

## 2024-08-21 DIAGNOSIS — Z12.31 ENCOUNTER FOR SCREENING MAMMOGRAM FOR MALIGNANT NEOPLASM OF BREAST: ICD-10-CM

## 2024-08-21 PROCEDURE — 77067 SCR MAMMO BI INCL CAD: CPT

## 2024-09-18 RX ORDER — RESVER/WINE/BFL/GRPSD/PC/C/POM 200MG-60MG
2 CAPSULE ORAL DAILY
Qty: 180 TABLET | Refills: 3 | Status: SHIPPED | OUTPATIENT
Start: 2024-09-18

## 2024-10-31 LAB — DIABETIC RETINOPATHY: NEGATIVE

## 2024-11-06 ENCOUNTER — OFFICE VISIT (OUTPATIENT)
Facility: CLINIC | Age: 79
End: 2024-11-06

## 2024-11-06 VITALS
DIASTOLIC BLOOD PRESSURE: 75 MMHG | BODY MASS INDEX: 23.14 KG/M2 | TEMPERATURE: 98.3 F | RESPIRATION RATE: 20 BRPM | OXYGEN SATURATION: 99 % | SYSTOLIC BLOOD PRESSURE: 115 MMHG | WEIGHT: 144 LBS | HEIGHT: 66 IN | HEART RATE: 89 BPM

## 2024-11-06 DIAGNOSIS — R56.9 SEIZURES (HCC): ICD-10-CM

## 2024-11-06 DIAGNOSIS — E11.9 TYPE 2 DIABETES MELLITUS WITHOUT COMPLICATION, WITHOUT LONG-TERM CURRENT USE OF INSULIN (HCC): ICD-10-CM

## 2024-11-06 DIAGNOSIS — E78.00 HYPERCHOLESTEROLEMIA: ICD-10-CM

## 2024-11-06 DIAGNOSIS — I10 PRIMARY HYPERTENSION: ICD-10-CM

## 2024-11-06 DIAGNOSIS — E11.9 TYPE 2 DIABETES MELLITUS WITHOUT COMPLICATION, WITHOUT LONG-TERM CURRENT USE OF INSULIN (HCC): Primary | ICD-10-CM

## 2024-11-06 DIAGNOSIS — I69.959 HEMIPARESIS AS LATE EFFECT OF CEREBROVASCULAR DISEASE, UNSPECIFIED CEREBROVASCULAR DISEASE TYPE, UNSPECIFIED LATERALITY (HCC): ICD-10-CM

## 2024-11-06 ASSESSMENT — PATIENT HEALTH QUESTIONNAIRE - PHQ9
SUM OF ALL RESPONSES TO PHQ QUESTIONS 1-9: 0
SUM OF ALL RESPONSES TO PHQ QUESTIONS 1-9: 0
SUM OF ALL RESPONSES TO PHQ9 QUESTIONS 1 & 2: 0
2. FEELING DOWN, DEPRESSED OR HOPELESS: NOT AT ALL
1. LITTLE INTEREST OR PLEASURE IN DOING THINGS: NOT AT ALL
SUM OF ALL RESPONSES TO PHQ QUESTIONS 1-9: 0
SUM OF ALL RESPONSES TO PHQ QUESTIONS 1-9: 0

## 2024-11-06 NOTE — PROGRESS NOTES
Identified pt with two pt identifiers(name and ). Reviewed record in preparation for visit and have obtained necessary documentation. All patient medications has been reviewed.  Chief Complaint   Patient presents with    Follow-up       Vitals:    24 1537   BP: 115/75   Pulse: 89   Resp: 20   Temp: 98.3 °F (36.8 °C)   SpO2: 99%                   Coordination of Care Questionnaire:   1) Have you been to an emergency room, urgent care, or hospitalized since your last visit?   no       2. Have seen or consulted any other health care provider since your last visit? no        Patient is accompanied by sister I have received verbal consent from Cassidy Jeong to discuss any/all medical information while they are present in the room.

## 2024-11-06 NOTE — PROGRESS NOTES
SPORTS MEDICINE AND PRIMARY CARE  Judson Uriostegui MD, FACP, CMD  [address] [facility] [address]  Phone:  [phone]  Fax: [phone]       Chief Complaint   Patient presents with    Follow-up   .      SUBJECTIVE:       Cassidy Jeong is a 79 y.o. female Patient returns today with her sister, Mili Fajardo, with a known history of seizure disorder, diabetes, hemiparesis, intellectual disability, dyslipidemia, hypertension, thyroid nodule, colonoscopy refused, and is seen for evaluation.    There are no new concerns and patient is seen for evaluation.             Current Outpatient Medications   Medication Sig Dispense Refill    D-1000 EXTRA STRENGTH 25 MCG (1000 UT) TABS tablet TAKE 2 TABLETS BY MOUTH EVERY  tablet 3    telmisartan-hydroCHLOROthiazide (MICARDIS HCT) 40-12.5 MG per tablet Take 1 tablet by mouth daily 90 tablet 3    metFORMIN (GLUCOPHAGE) 500 MG tablet Take 1 tablet by mouth daily (with breakfast) 90 tablet 3    lovastatin (MEVACOR) 40 MG tablet take 1 tablet by mouth nightly for HIGH cholesterol 90 tablet 3    phenytoin (DILANTIN) 100 MG ER capsule take 3 capsules by mouth ON ODD DAYS and 2 capsules ON EVEN DAYS 225 capsule 0    ergocalciferol (ERGOCALCIFEROL) 1.25 MG (39194 UT) capsule Take 1 capsule by mouth every 7 days       No current facility-administered medications for this visit.     Past Medical History:   Diagnosis Date    Colonoscopy refused 01/07/2016    Diabetes (HCC)     Fall     Fracture of rib of left side 07/23/2015    Hemiparesis (HCC) 1950    left    Hypercholesterolemia     Hypertension     Intellectual disability     S/P colonoscopy 04/17/2003    n    Seizures (HCC)     Thyroid nodule 09/21/2022     Past Surgical History:   Procedure Laterality Date    BREAST BIOPSY Left 2018    needle benign    SERGEY STEROTACTIC LOC BREAST BIOPSY LEFT Left 8/22/2018    SERGEY STEROTACTIC LOC BREAST BIOPSY LEFT 8/22/2018 [facility] RAD MAMMO     No Known Allergies      REVIEW OF

## 2024-11-07 LAB
ANION GAP SERPL CALC-SCNC: 6 MMOL/L (ref 2–12)
BUN SERPL-MCNC: 21 MG/DL (ref 6–20)
BUN/CREAT SERPL: 21 (ref 12–20)
CALCIUM SERPL-MCNC: 8.7 MG/DL (ref 8.5–10.1)
CHLORIDE SERPL-SCNC: 106 MMOL/L (ref 97–108)
CO2 SERPL-SCNC: 30 MMOL/L (ref 21–32)
CREAT SERPL-MCNC: 0.99 MG/DL (ref 0.55–1.02)
EST. AVERAGE GLUCOSE BLD GHB EST-MCNC: 108 MG/DL
GLUCOSE SERPL-MCNC: 103 MG/DL (ref 65–100)
HBA1C MFR BLD: 5.4 % (ref 4–5.6)
PHENYTOIN SERPL-MCNC: 11.1 UG/ML (ref 10–20)
POTASSIUM SERPL-SCNC: 4 MMOL/L (ref 3.5–5.1)
SODIUM SERPL-SCNC: 142 MMOL/L (ref 136–145)

## 2024-12-31 RX ORDER — PHENYTOIN SODIUM 100 MG/1
CAPSULE, EXTENDED RELEASE ORAL
Qty: 225 CAPSULE | Refills: 3 | Status: SHIPPED | OUTPATIENT
Start: 2024-12-31

## 2025-01-13 RX ORDER — LOVASTATIN 40 MG/1
TABLET ORAL
Qty: 90 TABLET | Refills: 3 | Status: SHIPPED | OUTPATIENT
Start: 2025-01-13

## 2025-08-06 ENCOUNTER — OFFICE VISIT (OUTPATIENT)
Facility: CLINIC | Age: 80
End: 2025-08-06
Payer: MEDICARE

## 2025-08-06 VITALS
HEART RATE: 77 BPM | HEIGHT: 66 IN | SYSTOLIC BLOOD PRESSURE: 132 MMHG | WEIGHT: 145.6 LBS | BODY MASS INDEX: 23.4 KG/M2 | RESPIRATION RATE: 16 BRPM | TEMPERATURE: 98.4 F | OXYGEN SATURATION: 97 % | DIASTOLIC BLOOD PRESSURE: 70 MMHG

## 2025-08-06 DIAGNOSIS — E78.00 HYPERCHOLESTEROLEMIA: ICD-10-CM

## 2025-08-06 DIAGNOSIS — E11.9 TYPE 2 DIABETES MELLITUS WITHOUT COMPLICATION, WITHOUT LONG-TERM CURRENT USE OF INSULIN (HCC): ICD-10-CM

## 2025-08-06 DIAGNOSIS — I69.959 HEMIPARESIS AS LATE EFFECT OF CEREBROVASCULAR DISEASE, UNSPECIFIED CEREBROVASCULAR DISEASE TYPE, UNSPECIFIED LATERALITY (HCC): ICD-10-CM

## 2025-08-06 DIAGNOSIS — E55.9 VITAMIN D DEFICIENCY: ICD-10-CM

## 2025-08-06 DIAGNOSIS — E53.8 VITAMIN B12 DEFICIENCY: ICD-10-CM

## 2025-08-06 DIAGNOSIS — R56.9 SEIZURES (HCC): ICD-10-CM

## 2025-08-06 DIAGNOSIS — Z00.00 MEDICARE ANNUAL WELLNESS VISIT, SUBSEQUENT: Primary | ICD-10-CM

## 2025-08-06 PROCEDURE — 3078F DIAST BP <80 MM HG: CPT | Performed by: INTERNAL MEDICINE

## 2025-08-06 PROCEDURE — 1123F ACP DISCUSS/DSCN MKR DOCD: CPT | Performed by: INTERNAL MEDICINE

## 2025-08-06 PROCEDURE — 99214 OFFICE O/P EST MOD 30 MIN: CPT | Performed by: INTERNAL MEDICINE

## 2025-08-06 PROCEDURE — G0439 PPPS, SUBSEQ VISIT: HCPCS | Performed by: INTERNAL MEDICINE

## 2025-08-06 PROCEDURE — 3075F SYST BP GE 130 - 139MM HG: CPT | Performed by: INTERNAL MEDICINE

## 2025-08-06 PROCEDURE — 1159F MED LIST DOCD IN RCRD: CPT | Performed by: INTERNAL MEDICINE

## 2025-08-06 PROCEDURE — 1126F AMNT PAIN NOTED NONE PRSNT: CPT | Performed by: INTERNAL MEDICINE

## 2025-08-06 RX ORDER — TELMISARTAN AND HYDROCHLORTHIAZIDE 40; 12.5 MG/1; MG/1
1 TABLET ORAL DAILY
Qty: 90 TABLET | Refills: 3 | Status: SHIPPED | OUTPATIENT
Start: 2025-08-06

## 2025-08-06 SDOH — ECONOMIC STABILITY: FOOD INSECURITY: WITHIN THE PAST 12 MONTHS, THE FOOD YOU BOUGHT JUST DIDN'T LAST AND YOU DIDN'T HAVE MONEY TO GET MORE.: NEVER TRUE

## 2025-08-06 SDOH — ECONOMIC STABILITY: FOOD INSECURITY: WITHIN THE PAST 12 MONTHS, YOU WORRIED THAT YOUR FOOD WOULD RUN OUT BEFORE YOU GOT MONEY TO BUY MORE.: NEVER TRUE

## 2025-08-06 ASSESSMENT — PATIENT HEALTH QUESTIONNAIRE - PHQ9
1. LITTLE INTEREST OR PLEASURE IN DOING THINGS: NOT AT ALL
SUM OF ALL RESPONSES TO PHQ QUESTIONS 1-9: 0
2. FEELING DOWN, DEPRESSED OR HOPELESS: NOT AT ALL
SUM OF ALL RESPONSES TO PHQ QUESTIONS 1-9: 0

## 2025-08-06 ASSESSMENT — LIFESTYLE VARIABLES
HOW OFTEN DO YOU HAVE A DRINK CONTAINING ALCOHOL: NEVER
HOW MANY STANDARD DRINKS CONTAINING ALCOHOL DO YOU HAVE ON A TYPICAL DAY: PATIENT DOES NOT DRINK

## 2025-08-07 ENCOUNTER — RESULTS FOLLOW-UP (OUTPATIENT)
Facility: CLINIC | Age: 80
End: 2025-08-07

## 2025-08-07 LAB
25(OH)D3 SERPL-MCNC: 70.4 NG/ML (ref 30–100)
ALBUMIN SERPL-MCNC: 3.6 G/DL (ref 3.5–5.2)
ALBUMIN/GLOB SERPL: 0.9 (ref 1.1–2.2)
ALP SERPL-CCNC: 58 U/L (ref 35–104)
ALT SERPL-CCNC: 17 U/L (ref 10–35)
ANION GAP SERPL CALC-SCNC: 11 MMOL/L (ref 2–14)
APPEARANCE UR: CLEAR
AST SERPL-CCNC: 19 U/L (ref 10–35)
BACTERIA URNS QL MICRO: ABNORMAL /HPF
BASOPHILS # BLD: 0.02 K/UL (ref 0–0.1)
BASOPHILS NFR BLD: 0.3 % (ref 0–1)
BILIRUB SERPL-MCNC: <0.2 MG/DL (ref 0–1.2)
BILIRUB UR QL: NEGATIVE
BUN SERPL-MCNC: 15 MG/DL (ref 8–23)
BUN/CREAT SERPL: 20 (ref 12–20)
CALCIUM SERPL-MCNC: 9 MG/DL (ref 8.8–10.2)
CHLORIDE SERPL-SCNC: 100 MMOL/L (ref 98–107)
CHOLEST SERPL-MCNC: 180 MG/DL (ref 0–200)
CO2 SERPL-SCNC: 28 MMOL/L (ref 20–29)
COLOR UR: ABNORMAL
CREAT SERPL-MCNC: 0.72 MG/DL (ref 0.6–1)
CREAT UR-MCNC: 20.2 MG/DL (ref 28–217)
DIFFERENTIAL METHOD BLD: NORMAL
EOSINOPHIL # BLD: 0.18 K/UL (ref 0–0.4)
EOSINOPHIL NFR BLD: 2.7 % (ref 0–7)
EPITH CASTS URNS QL MICRO: ABNORMAL /LPF
ERYTHROCYTE [DISTWIDTH] IN BLOOD BY AUTOMATED COUNT: 12.6 % (ref 11.5–14.5)
EST. AVERAGE GLUCOSE BLD GHB EST-MCNC: 114 MG/DL
GLOBULIN SER CALC-MCNC: 3.9 G/DL (ref 2–4)
GLUCOSE SERPL-MCNC: 74 MG/DL (ref 65–100)
GLUCOSE UR STRIP.AUTO-MCNC: NEGATIVE MG/DL
HBA1C MFR BLD: 5.6 % (ref 4–5.6)
HCT VFR BLD AUTO: 37.2 % (ref 35–47)
HDLC SERPL-MCNC: 74 MG/DL (ref 40–60)
HDLC SERPL: 2.4
HGB BLD-MCNC: 11.9 G/DL (ref 11.5–16)
HGB UR QL STRIP: NEGATIVE
IMM GRANULOCYTES # BLD AUTO: 0.01 K/UL (ref 0–0.04)
IMM GRANULOCYTES NFR BLD AUTO: 0.1 % (ref 0–0.5)
KETONES UR QL STRIP.AUTO: NEGATIVE MG/DL
LDLC SERPL CALC-MCNC: 98 MG/DL
LEUKOCYTE ESTERASE UR QL STRIP.AUTO: ABNORMAL
LYMPHOCYTES # BLD: 2.21 K/UL (ref 0.8–3.5)
LYMPHOCYTES NFR BLD: 33 % (ref 12–49)
MCH RBC QN AUTO: 30.2 PG (ref 26–34)
MCHC RBC AUTO-ENTMCNC: 32 G/DL (ref 30–36.5)
MCV RBC AUTO: 94.4 FL (ref 80–99)
MICROALBUMIN UR-MCNC: <1.2 MG/DL
MICROALBUMIN/CREAT UR-RTO: ABNORMAL MG/G
MONOCYTES # BLD: 0.54 K/UL (ref 0–1)
MONOCYTES NFR BLD: 8.1 % (ref 5–13)
NEUTS SEG # BLD: 3.73 K/UL (ref 1.8–8)
NEUTS SEG NFR BLD: 55.8 % (ref 32–75)
NITRITE UR QL STRIP.AUTO: NEGATIVE
NRBC # BLD: 0 K/UL (ref 0–0.01)
NRBC BLD-RTO: 0 PER 100 WBC
PH UR STRIP: 7 (ref 5–8)
PHENOBARB SERPL-MCNC: <2.4 UG/ML (ref 10–30)
PLATELET # BLD AUTO: 233 K/UL (ref 150–400)
PMV BLD AUTO: 9.5 FL (ref 8.9–12.9)
POTASSIUM SERPL-SCNC: 3.8 MMOL/L (ref 3.5–5.1)
PROT SERPL-MCNC: 7.5 G/DL (ref 6.4–8.3)
PROT UR STRIP-MCNC: NEGATIVE MG/DL
RBC # BLD AUTO: 3.94 M/UL (ref 3.8–5.2)
RBC #/AREA URNS HPF: ABNORMAL /HPF (ref 0–5)
SODIUM SERPL-SCNC: 139 MMOL/L (ref 136–145)
SP GR UR REFRACTOMETRY: 1 (ref 1–1.03)
SPECIMEN HOLD: NORMAL
TRIGL SERPL-MCNC: 39 MG/DL (ref 0–150)
TSH, 3RD GENERATION: 0.5 UIU/ML (ref 0.27–4.2)
UROBILINOGEN UR QL STRIP.AUTO: 1 EU/DL (ref 0.2–1)
VIT B12 SERPL-MCNC: 409 PG/ML (ref 232–1245)
VLDLC SERPL CALC-MCNC: 8 MG/DL
WBC # BLD AUTO: 6.7 K/UL (ref 3.6–11)
WBC URNS QL MICRO: ABNORMAL /HPF (ref 0–4)